# Patient Record
Sex: MALE | Race: WHITE | NOT HISPANIC OR LATINO | Employment: UNEMPLOYED | URBAN - NONMETROPOLITAN AREA
[De-identification: names, ages, dates, MRNs, and addresses within clinical notes are randomized per-mention and may not be internally consistent; named-entity substitution may affect disease eponyms.]

---

## 2017-03-07 ENCOUNTER — OFFICE VISIT (OUTPATIENT)
Dept: CARDIOLOGY | Facility: CLINIC | Age: 47
End: 2017-03-07

## 2017-03-07 VITALS
HEART RATE: 101 BPM | SYSTOLIC BLOOD PRESSURE: 134 MMHG | DIASTOLIC BLOOD PRESSURE: 80 MMHG | WEIGHT: 159 LBS | HEIGHT: 69 IN | OXYGEN SATURATION: 98 % | BODY MASS INDEX: 23.55 KG/M2

## 2017-03-07 DIAGNOSIS — I10 ESSENTIAL HYPERTENSION: ICD-10-CM

## 2017-03-07 DIAGNOSIS — I77.9 BILATERAL CAROTID ARTERY DISEASE (HCC): ICD-10-CM

## 2017-03-07 DIAGNOSIS — R42 DIZZINESS: ICD-10-CM

## 2017-03-07 DIAGNOSIS — R00.0 TACHYCARDIA: ICD-10-CM

## 2017-03-07 DIAGNOSIS — R06.02 SHORTNESS OF BREATH: ICD-10-CM

## 2017-03-07 DIAGNOSIS — E11.9 TYPE 2 DIABETES MELLITUS WITHOUT COMPLICATION, WITHOUT LONG-TERM CURRENT USE OF INSULIN (HCC): ICD-10-CM

## 2017-03-07 DIAGNOSIS — R00.2 PALPITATIONS: ICD-10-CM

## 2017-03-07 DIAGNOSIS — R07.9 CHEST PAIN, UNSPECIFIED TYPE: Primary | ICD-10-CM

## 2017-03-07 DIAGNOSIS — E78.5 HYPERLIPIDEMIA, UNSPECIFIED HYPERLIPIDEMIA TYPE: ICD-10-CM

## 2017-03-07 PROCEDURE — 93000 ELECTROCARDIOGRAM COMPLETE: CPT | Performed by: NURSE PRACTITIONER

## 2017-03-07 PROCEDURE — 99214 OFFICE O/P EST MOD 30 MIN: CPT | Performed by: NURSE PRACTITIONER

## 2017-03-07 RX ORDER — AMITRIPTYLINE HYDROCHLORIDE 25 MG/1
25 TABLET, FILM COATED ORAL NIGHTLY
COMMUNITY
Start: 2017-03-02 | End: 2018-04-11

## 2017-03-07 RX ORDER — CLOPIDOGREL BISULFATE 75 MG/1
75 TABLET ORAL DAILY
COMMUNITY
Start: 2017-03-01 | End: 2018-07-09 | Stop reason: HOSPADM

## 2017-03-07 RX ORDER — BUDESONIDE AND FORMOTEROL FUMARATE DIHYDRATE 160; 4.5 UG/1; UG/1
2 AEROSOL RESPIRATORY (INHALATION)
COMMUNITY
Start: 2017-01-17

## 2017-03-07 RX ORDER — PANTOPRAZOLE SODIUM 40 MG/1
40 TABLET, DELAYED RELEASE ORAL DAILY
COMMUNITY
Start: 2017-03-01

## 2017-03-07 RX ORDER — INSULIN LISPRO 100 [IU]/ML
20 INJECTION, SOLUTION INTRAVENOUS; SUBCUTANEOUS 4 TIMES DAILY
COMMUNITY
Start: 2017-02-02

## 2017-03-07 RX ORDER — TIZANIDINE 4 MG/1
4 TABLET ORAL 3 TIMES DAILY
COMMUNITY
Start: 2017-03-01

## 2017-03-07 RX ORDER — INSULIN GLARGINE 100 [IU]/ML
INJECTION, SOLUTION SUBCUTANEOUS
COMMUNITY
Start: 2017-02-02 | End: 2018-04-11 | Stop reason: ALTCHOICE

## 2017-03-07 RX ORDER — ASPIRIN 81 MG/1
81 TABLET ORAL DAILY
COMMUNITY
Start: 2017-03-01

## 2017-03-07 RX ORDER — ATORVASTATIN CALCIUM 20 MG/1
20 TABLET, FILM COATED ORAL NIGHTLY
COMMUNITY
Start: 2017-03-01

## 2017-03-07 RX ORDER — GABAPENTIN 600 MG/1
600 TABLET ORAL 3 TIMES DAILY
COMMUNITY
Start: 2017-03-01

## 2017-03-07 RX ORDER — LISINOPRIL 5 MG/1
5 TABLET ORAL DAILY
COMMUNITY
Start: 2017-01-03 | End: 2017-10-12 | Stop reason: ALTCHOICE

## 2017-03-07 RX ORDER — ISOSORBIDE MONONITRATE 30 MG/1
30 TABLET, EXTENDED RELEASE ORAL DAILY
Qty: 30 TABLET | Refills: 5 | Status: SHIPPED | OUTPATIENT
Start: 2017-03-07 | End: 2017-06-20 | Stop reason: SINTOL

## 2017-03-07 RX ORDER — MELOXICAM 15 MG/1
15 TABLET ORAL DAILY
COMMUNITY
Start: 2017-03-01

## 2017-03-07 RX ORDER — ISOSORBIDE MONONITRATE 30 MG/1
30 TABLET, EXTENDED RELEASE ORAL DAILY
COMMUNITY
End: 2017-03-07 | Stop reason: SDUPTHER

## 2017-03-07 NOTE — PATIENT INSTRUCTIONS
Palpitations  A palpitation is the feeling that your heartbeat is irregular or is faster than normal. It may feel like your heart is fluttering or skipping a beat. Palpitations are usually not a serious problem. However, in some cases, you may need further medical evaluation.  CAUSES   Palpitations can be caused by:  · Smoking.  · Caffeine or other stimulants, such as diet pills or energy drinks.  · Alcohol.  · Stress and anxiety.  · Strenuous physical activity.  · Fatigue.  · Certain medicines.  · Heart disease, especially if you have a history of irregular heart rhythms (arrhythmias), such as atrial fibrillation, atrial flutter, or supraventricular tachycardia.  · An improperly working pacemaker or defibrillator.  DIAGNOSIS   To find the cause of your palpitations, your health care provider will take your medical history and perform a physical exam. Your health care provider may also have you take a test called an ambulatory electrocardiogram (ECG). An ECG records your heartbeat patterns over a 24-hour period. You may also have other tests, such as:  · Transthoracic echocardiogram (TTE). During echocardiography, sound waves are used to evaluate how blood flows through your heart.  · Transesophageal echocardiogram (ALY).  · Cardiac monitoring. This allows your health care provider to monitor your heart rate and rhythm in real time.  · Holter monitor. This is a portable device that records your heartbeat and can help diagnose heart arrhythmias. It allows your health care provider to track your heart activity for several days, if needed.  · Stress tests by exercise or by giving medicine that makes the heart beat faster.  TREATMENT   Treatment of palpitations depends on the cause of your symptoms and can vary greatly. Most cases of palpitations do not require any treatment other than time, relaxation, and monitoring your symptoms. Other causes, such as atrial fibrillation, atrial flutter, or supraventricular  tachycardia, usually require further treatment.  HOME CARE INSTRUCTIONS   · Avoid:    Caffeinated coffee, tea, soft drinks, diet pills, and energy drinks.    Chocolate.    Alcohol.  · Stop smoking if you smoke.  · Reduce your stress and anxiety. Things that can help you relax include:    A method of controlling things in your body, such as your heartbeats, with your mind (biofeedback).    Yoga.    Meditation.    Physical activity such as swimming, jogging, or walking.  · Get plenty of rest and sleep.  SEEK MEDICAL CARE IF:   · You continue to have a fast or irregular heartbeat beyond 24 hours.  · Your palpitations occur more often.  SEEK IMMEDIATE MEDICAL CARE IF:  · You have chest pain or shortness of breath.  · You have a severe headache.  · You feel dizzy or you faint.  MAKE SURE YOU:  · Understand these instructions.  · Will watch your condition.  · Will get help right away if you are not doing well or get worse.     This information is not intended to replace advice given to you by your health care provider. Make sure you discuss any questions you have with your health care provider.     Document Released: 12/15/2001 Document Revised: 12/23/2014 Document Reviewed: 09/01/2016  [employer] Interactive Patient Education ©2016 [employer]  You Can Quit Smoking  If you are ready to quit smoking or are thinking about it, congratulations! You have chosen to help yourself be healthier and live longer! There are lots of different ways to quit smoking. Nicotine gum, nicotine patches, a nicotine inhaler, or nicotine nasal spray can help with physical craving. Hypnosis, support groups, and medicines help break the habit of smoking.  TIPS TO GET OFF AND STAY OFF CIGARETTES  · Learn to predict your moods. Do not let a bad situation be your excuse to have a cigarette. Some situations in your life might tempt you to have a cigarette.  · Ask friends and co-workers not to smoke around you.  · Make your home smoke-free.  · Never  "have \"just one\" cigarette. It leads to wanting another and another. Remind yourself of your decision to quit.  · On a card, make a list of your reasons for not smoking. Read it at least the same number of times a day as you have a cigarette. Tell yourself everyday, \"I do not want to smoke. I choose not to smoke.\"  · Ask someone at home or work to help you with your plan to quit smoking.  · Have something planned after you eat or have a cup of coffee. Take a walk or get other exercise to perk you up. This will help to keep you from overeating.  · Try a relaxation exercise to calm you down and decrease your stress. Remember, you may be tense and nervous the first two weeks after you quit. This will pass.  · Find new activities to keep your hands busy. Play with a pen, coin, or rubber band. Doodle or draw things on paper.  · Brush your teeth right after eating. This will help cut down the craving for the taste of tobacco after meals. You can try mouthwash too.  · Try gum, breath mints, or diet candy to keep something in your mouth.  IF YOU SMOKE AND WANT TO QUIT:  · Do not stock up on cigarettes. Never buy a carton. Wait until one pack is finished before you buy another.  · Never carry cigarettes with you at work or at home.  · Keep cigarettes as far away from you as possible. Leave them with someone else.  · Never carry matches or a lighter with you.  · Ask yourself, \"Do I need this cigarette or is this just a reflex?\"  · Bet with someone that you can quit. Put cigarette money in a [employer] bank every morning. If you smoke, you give up the money. If you do not smoke, by the end of the week, you keep the money.  · Keep trying. It takes 21 days to change a habit!  · Talk to your doctor about using medicines to help you quit. These include nicotine replacement gum, lozenges, or skin patches.     This information is not intended to replace advice given to you by your health care provider. Make sure you discuss any questions " you have with your health care provider.     Document Released: 10/14/2010 Document Revised: 03/11/2013 Document Reviewed: 05/03/2016  [employer] Interactive Patient Education ©2016 [employer]

## 2017-03-07 NOTE — PROGRESS NOTES
Subjective   Hugo Castaneda is a 46 y.o. male     Chief Complaint   Patient presents with   • Follow-up     presents as a follow up   • Surgical Clearance       HPI    PROBLEM LIST:     1. Hypertension.  1.1 Echo 9/29/15 - EF > 65%; mild thickening of AIS, mild TR, [place]  2. Dyslipidemia. Off statins due to elevated LFTs  3. Diabetes mellitus II.   4. Chronic tobacco habituation.  5. Low back pain.   6. Stress Test 10/5/15 - no ischemia, low risk  7. Carotid Artery Disease  7.1 Carotid Artery U/S 9/29/15 - 16-49% OSMANY and LICA stenosis; antegrade flow both vertebrals  8. COPD    Patient is a 46-year-old male who presents today for a follow-up and cardiac clearance.  He denies any chest pain or pressure.  He says that he had an episode of palpitations a couple of weeks ago.  He will get dizzy at times with position changes, but denies any presyncope or syncope.  He denies any orthopnea, PND or edema.  He will get short of breath with activity, which has not changed in a long time and he relates this to his COPD.  Patient still smokes 1/2 PPD.  PCP monitors his cholesterol.     Current Outpatient Prescriptions   Medication Sig Dispense Refill   • amitriptyline (ELAVIL) 25 MG tablet Take 25 mg by mouth Every Night.     • aspirin 81 MG EC tablet Take 81 mg by mouth Daily.     • atorvastatin (LIPITOR) 20 MG tablet Take 20 mg by mouth Every Night.     • clopidogrel (PLAVIX) 75 MG tablet Take 75 mg by mouth Daily.     • gabapentin (NEURONTIN) 600 MG tablet Take 600 mg by mouth 3 (Three) Times a Day.     • HUMALOG KWIKPEN 100 UNIT/ML solution pen-injector      • isosorbide mononitrate (IMDUR) 30 MG 24 hr tablet Take 1 tablet by mouth Daily. Take 1/2 tablet daily 30 tablet 5   • LANTUS SOLOSTAR 100 UNIT/ML injection pen      • lisinopril (PRINIVIL,ZESTRIL) 5 MG tablet Take 5 mg by mouth Daily.     • meloxicam (MOBIC) 15 MG tablet Take 15 mg by mouth Daily.     • metFORMIN (GLUCOPHAGE) 1000 MG tablet Take 1,000 mg by mouth 2  (Two) Times a Day.     • pantoprazole (PROTONIX) 40 MG EC tablet Take 40 mg by mouth Daily.     • SYMBICORT 160-4.5 MCG/ACT inhaler      • tiZANidine (ZANAFLEX) 4 MG tablet Take 4 mg by mouth 3 (Three) Times a Day.     • VENTOLIN  (90 BASE) MCG/ACT inhaler        No current facility-administered medications for this visit.        ALLERGIES    Review of patient's allergies indicates no known allergies.    Past Medical History   Diagnosis Date   • Diabetes mellitus    • Hyperlipidemia    • Hypertension        Social History     Social History   • Marital status: Single     Spouse name: N/A   • Number of children: N/A   • Years of education: N/A     Occupational History   • Not on file.     Social History Main Topics   • Smoking status: Current Every Day Smoker     Packs/day: 1.00     Types: Cigarettes   • Smokeless tobacco: Not on file      Comment: 1 pack every 2 days   • Alcohol use No   • Drug use: No   • Sexual activity: Defer     Other Topics Concern   • Not on file     Social History Narrative   • No narrative on file       Family History   Problem Relation Age of Onset   • No Known Problems Mother    • No Known Problems Father        Review of Systems   Constitutional: Positive for fatigue. Negative for diaphoresis.   HENT: Positive for rhinorrhea and sneezing.    Eyes: Positive for visual disturbance (wears glasses ).   Respiratory: Positive for shortness of breath (No change with activity  COPD ). Negative for chest tightness.    Cardiovascular: Positive for palpitations (on episode a couple of weeks ago. ) and leg swelling (all of the time). Negative for chest pain.   Gastrointestinal: Negative for nausea and vomiting.   Endocrine: Negative.    Genitourinary: Negative for difficulty urinating.        H/O cystoscope bladder had collapsed.    Musculoskeletal: Positive for arthralgias, back pain (L4-L5 needs rods ) and myalgias.   Skin: Negative.    Allergic/Immunologic: Positive for environmental  "allergies.   Neurological: Positive for dizziness (when gets up at times ), numbness (BLE weakness; will fall ) and headaches. Negative for syncope and light-headedness.   Hematological: Bruises/bleeds easily.   Psychiatric/Behavioral: The patient is nervous/anxious.        Objective   Visit Vitals   • /80 (BP Location: Left arm, Patient Position: Sitting)   • Pulse 101   • Ht 69\" (175.3 cm)   • Wt 159 lb (72.1 kg)   • SpO2 98%   • BMI 23.48 kg/m2     Lab Results (most recent)     None        Physical Exam   Constitutional: He is oriented to person, place, and time. Vital signs are normal. He appears well-developed and well-nourished. He is active and cooperative.   HENT:   Head: Normocephalic.   Mouth/Throat: Abnormal dentition (edentulous ).   Eyes: Lids are normal.   Wears glasses    Neck: Normal carotid pulses, no hepatojugular reflux and no JVD present. Carotid bruit is not present.   Cardiovascular: Regular rhythm and normal heart sounds.  Tachycardia present.    Pulses:       Radial pulses are 2+ on the right side, and 2+ on the left side.        Dorsalis pedis pulses are 2+ on the right side, and 2+ on the left side.        Posterior tibial pulses are 2+ on the right side, and 2+ on the left side.   No edema BLE.    Pulmonary/Chest: Effort normal and breath sounds normal.   Abdominal: Normal appearance.   Neurological: He is alert and oriented to person, place, and time.   Skin: Skin is warm, dry and intact.   Psychiatric: He has a normal mood and affect. His speech is normal and behavior is normal. Judgment and thought content normal. Cognition and memory are normal.       Procedure     ECG 12 Lead  Date/Time: 3/7/2017 2:43 PM  Performed by: SANDRITA JEROME  Authorized by: SANDRITA JEROME   Comparison: compared with previous ECG from 11/25/2015  Similar to previous ECG  Rhythm: sinus tachycardia  Rate: tachycardic  BPM: 103  QRS axis: normal  Other findings: LAE  Clinical impression: non-specific " ECG  Comments: Chest pain  Palpitations  Shortness of breath               Assessment/Plan      Diagnosis Plan   1. Chest pain, unspecified type  ECG 12 Lead   2. Palpitations  ECG 12 Lead    isosorbide mononitrate (IMDUR) 30 MG 24 hr tablet   3. Shortness of breath  ECG 12 Lead   4. Essential hypertension  isosorbide mononitrate (IMDUR) 30 MG 24 hr tablet   5. Hyperlipidemia, unspecified hyperlipidemia type     6. Type 2 diabetes mellitus without complication, without long-term current use of insulin     7. Bilateral carotid artery disease  Duplex Carotid Ultrasound CAR    Duplex Carotid Ultrasound CAR   8. Dizziness  Duplex Carotid Ultrasound CAR    Duplex Carotid Ultrasound CAR   9. Tachycardia         Return for After testing.         Patient is doing well from a cardiac standpoint.  We will be able to clear him for surgery.  He will continue his medication regimen.  He will get a repeat carotid US.  He will follow-up after testing or sooner if any changes. He was encouraged on smoking cessation.

## 2017-06-19 ENCOUNTER — TELEPHONE (OUTPATIENT)
Dept: CARDIOLOGY | Facility: CLINIC | Age: 47
End: 2017-06-19

## 2017-06-19 NOTE — TELEPHONE ENCOUNTER
----- Message from Sheri Phan sent at 6/19/2017 12:10 PM EDT -----  [phone] is his number. When he stands up he is having dizzy spells and would like to talk to a nurse. Please advise. kg    Patient reports that he is having increase syncopal episodes over the last 2 weeks. States that he hit his head a few days ago. Per NORMA Rainey patient to be seen in office in the AM.

## 2017-06-20 ENCOUNTER — TELEPHONE (OUTPATIENT)
Dept: CARDIOLOGY | Facility: CLINIC | Age: 47
End: 2017-06-20

## 2017-06-20 ENCOUNTER — OFFICE VISIT (OUTPATIENT)
Dept: CARDIOLOGY | Facility: CLINIC | Age: 47
End: 2017-06-20

## 2017-06-20 VITALS
DIASTOLIC BLOOD PRESSURE: 53 MMHG | OXYGEN SATURATION: 98 % | HEIGHT: 69 IN | BODY MASS INDEX: 25.09 KG/M2 | SYSTOLIC BLOOD PRESSURE: 94 MMHG | WEIGHT: 169.4 LBS | HEART RATE: 88 BPM

## 2017-06-20 DIAGNOSIS — E78.5 HYPERLIPIDEMIA, UNSPECIFIED HYPERLIPIDEMIA TYPE: Primary | ICD-10-CM

## 2017-06-20 DIAGNOSIS — I10 ESSENTIAL HYPERTENSION: ICD-10-CM

## 2017-06-20 DIAGNOSIS — F17.200 SMOKING: ICD-10-CM

## 2017-06-20 DIAGNOSIS — I77.9 BILATERAL CAROTID ARTERY DISEASE (HCC): ICD-10-CM

## 2017-06-20 DIAGNOSIS — R00.2 PALPITATIONS: ICD-10-CM

## 2017-06-20 DIAGNOSIS — R06.02 SHORTNESS OF BREATH: ICD-10-CM

## 2017-06-20 DIAGNOSIS — R07.2 PRECORDIAL PAIN: ICD-10-CM

## 2017-06-20 DIAGNOSIS — R55 SYNCOPE, UNSPECIFIED SYNCOPE TYPE: ICD-10-CM

## 2017-06-20 PROCEDURE — 99214 OFFICE O/P EST MOD 30 MIN: CPT | Performed by: NURSE PRACTITIONER

## 2017-06-20 NOTE — PROGRESS NOTES
Subjective   Hugo Castaneda is a 47 y.o. male     Chief Complaint   Patient presents with   • Follow-up     presents as a follow up   • Loss of Consciousness       HPI    PROBLEM LIST:     1. Hypertension.  1.1 Echo 9/29/15 - EF > 65%; mild thickening of AIS, mild TR, [place]  2. Dyslipidemia. Off statins due to elevated LFTs  3. Diabetes mellitus II.   4. Chronic tobacco habituation.  5. Low back pain.   6. Stress Test 10/5/15 - no ischemia, low risk  7. Carotid Artery Disease  7.1 Carotid Artery U/S 9/29/15 - 16-49% OSMANY and LICA stenosis; antegrade flow both vertebrals  8. COPD    Patient is a 47-year-old male who presents today due to syncope.  He had carotid US ordered the last time he was here, but has not had it done yet.  He says he has been having midsternum chest heaviness that will radiate into his left arm.  He will get short of breath, lightheaded and diaphoretic when it occurs.  He says he will take nitro and has taken up to 2.  He has been having more freq palpitations as well.  He will have them quite often especially when he lays down.  He denies any dizziness.  He says he has been passing out a lot, mainly when he stands up.  He has been having them more freq in the past 2 weeks.  He says the only thing that occurs prior to passing out is he will see black.  He does not know what his blood pressure is nor his sugar, but his sugar is rarely less than 200 as it was 400 this AM.  He will get short of breath all of the time.  He has not been to ER for any of his syncopal episodes.  Patient is still smoking 1 pack every 3 days.  He was negative for orthostatic hypotension.     Current Outpatient Prescriptions   Medication Sig Dispense Refill   • amitriptyline (ELAVIL) 25 MG tablet Take 25 mg by mouth Every Night.     • aspirin 81 MG EC tablet Take 81 mg by mouth Daily.     • atorvastatin (LIPITOR) 20 MG tablet Take 20 mg by mouth Every Night.     • clopidogrel (PLAVIX) 75 MG tablet Take 75 mg by mouth  Daily.     • gabapentin (NEURONTIN) 600 MG tablet Take 600 mg by mouth 3 (Three) Times a Day.     • HUMALOG KWIKPEN 100 UNIT/ML solution pen-injector      • LANTUS SOLOSTAR 100 UNIT/ML injection pen      • lisinopril (PRINIVIL,ZESTRIL) 5 MG tablet Take 5 mg by mouth Daily.     • meloxicam (MOBIC) 15 MG tablet Take 15 mg by mouth Daily.     • metFORMIN (GLUCOPHAGE) 1000 MG tablet Take 1,000 mg by mouth 2 (Two) Times a Day.     • pantoprazole (PROTONIX) 40 MG EC tablet Take 40 mg by mouth Daily.     • SYMBICORT 160-4.5 MCG/ACT inhaler      • tiZANidine (ZANAFLEX) 4 MG tablet Take 4 mg by mouth 3 (Three) Times a Day.     • VENTOLIN  (90 BASE) MCG/ACT inhaler        No current facility-administered medications for this visit.        ALLERGIES    Review of patient's allergies indicates no known allergies.    Past Medical History:   Diagnosis Date   • Diabetes mellitus    • Hyperlipidemia    • Hypertension        Social History     Social History   • Marital status: Single     Spouse name: N/A   • Number of children: N/A   • Years of education: N/A     Occupational History   • Not on file.     Social History Main Topics   • Smoking status: Current Every Day Smoker     Packs/day: 1.00     Types: Cigarettes   • Smokeless tobacco: Not on file      Comment: 1 pack every 2 days   • Alcohol use No   • Drug use: No   • Sexual activity: Defer     Other Topics Concern   • Not on file     Social History Narrative       Family History   Problem Relation Age of Onset   • No Known Problems Mother    • No Known Problems Father        Review of Systems   Constitutional: Positive for diaphoresis (with CP ) and fatigue.   HENT: Positive for nosebleeds (2 mos after he eats ). Negative for rhinorrhea and sneezing.    Eyes: Positive for visual disturbance (wears glasses ).   Respiratory: Positive for shortness of breath (all of the time, no change and with CP ). Negative for chest tightness.    Cardiovascular: Positive for chest pain  "(midsternum heaviness and radiates into left arm at least an episode/week, using nitro) and palpitations (happen quite often even when he lays down). Negative for leg swelling.   Gastrointestinal: Positive for nausea and vomiting.   Endocrine: Negative.    Genitourinary: Negative for difficulty urinating.   Musculoskeletal: Positive for arthralgias and neck pain (like a muscle cramp ). Negative for back pain.   Skin: Negative.    Allergic/Immunologic: Positive for environmental allergies.   Neurological: Positive for syncope (for about 2 weeks, twice a week and now more often; sees black before it happens ), light-headedness (with CP ) and headaches. Negative for dizziness.   Hematological: Bruises/bleeds easily.   Psychiatric/Behavioral: The patient is nervous/anxious.        Objective   BP 94/53 (BP Location: Left arm, Patient Position: Standing)  Pulse 88  Ht 69\" (175.3 cm)  Wt 169 lb 6.4 oz (76.8 kg)  SpO2 98%  BMI 25.02 kg/m2  Lab Results (most recent)     None        Physical Exam   Constitutional: He is oriented to person, place, and time. Vital signs are normal. He appears well-developed and well-nourished. He is active and cooperative.   HENT:   Head: Normocephalic.   Mouth/Throat: Abnormal dentition (edentulous ).   Eyes: Lids are normal.   Wears glasses    Neck: Normal carotid pulses, no hepatojugular reflux and no JVD present. Carotid bruit is not present.   Cardiovascular: Normal rate, regular rhythm and normal heart sounds.    Pulses:       Radial pulses are 2+ on the right side, and 2+ on the left side.        Dorsalis pedis pulses are 2+ on the right side, and 2+ on the left side.        Posterior tibial pulses are 2+ on the right side, and 2+ on the left side.   No edema BLE.    Pulmonary/Chest: Effort normal and breath sounds normal.   Abdominal: Normal appearance and bowel sounds are normal.   Neurological: He is alert and oriented to person, place, and time.   Skin: Skin is warm, dry and " intact.   Psychiatric: He has a normal mood and affect. His speech is normal and behavior is normal. Judgment and thought content normal. Cognition and memory are normal.       Procedure   Procedures         Assessment/Plan      Diagnosis Plan   1. Hyperlipidemia, unspecified hyperlipidemia type  Stress Test With Myocardial Perfusion One Day    Adult Transthoracic Echo Complete    Stress Test With Myocardial Perfusion One Day    Adult Transthoracic Echo Complete   2. Essential hypertension  Stress Test With Myocardial Perfusion One Day    Adult Transthoracic Echo Complete    Stress Test With Myocardial Perfusion One Day    Adult Transthoracic Echo Complete   3. Palpitations  Stress Test With Myocardial Perfusion One Day    Adult Transthoracic Echo Complete    Cardiac Event Monitor    Stress Test With Myocardial Perfusion One Day    Adult Transthoracic Echo Complete    Cardiac Event Monitor   4. Smoking  Stress Test With Myocardial Perfusion One Day    Adult Transthoracic Echo Complete    Stress Test With Myocardial Perfusion One Day    Adult Transthoracic Echo Complete   5. Bilateral carotid artery disease  Stress Test With Myocardial Perfusion One Day    Adult Transthoracic Echo Complete    Stress Test With Myocardial Perfusion One Day    Adult Transthoracic Echo Complete   6. Precordial pain  D-dimer, Quantitative    CK-MB    Troponin I    Stress Test With Myocardial Perfusion One Day    Adult Transthoracic Echo Complete    D-dimer, Quantitative    CK-MB    Troponin I    Stress Test With Myocardial Perfusion One Day    Adult Transthoracic Echo Complete   7. Shortness of breath  D-dimer, Quantitative    CK-MB    Troponin I    Stress Test With Myocardial Perfusion One Day    Adult Transthoracic Echo Complete    D-dimer, Quantitative    CK-MB    Troponin I    Stress Test With Myocardial Perfusion One Day    Adult Transthoracic Echo Complete   8. Syncope, unspecified syncope type  Tilt Table    Tilt Table       Return  in about 8 weeks (around 8/15/2017).       CP/Shortness of breath/HTN/Dyslipidemia/Smoking/Syncpoe - patient will have an ischemia work-up, stress and echo.  Palpitations/Syncope - will have a tilt test and wear an event monitor.  Ted Carotid Artery disease/Syncope - will have repeat carotid artery US.  Patient will continue his medication regimen, except he will stop Imdur.  He will follow-up in 8 weeks or sooner if any changes.  He will use Nitro PRN for chest pain, no resolution he will go to the ER.  He sees his PCP today and will have them send labs.  He was encouraged on smoking cessation.

## 2017-06-20 NOTE — PATIENT INSTRUCTIONS
Syncope  Syncope is when you temporarily lose consciousness. Syncope may also be called fainting or passing out. It is caused by a sudden decrease in blood flow to the brain. Even though most causes of syncope are not dangerous, syncope can be a sign of a serious medical problem. Signs that you may be about to faint include:  · Feeling dizzy or light-headed.  · Feeling nauseous.  · Seeing all white or all black in your field of vision.  · Having cold, clammy skin.  If you fainted, get medical help right away. Call your local emergency services (911 in the [place] Do not drive yourself to the hospital.  HOME CARE INSTRUCTIONS  Pay attention to any changes in your symptoms. Take these actions to help with your condition:  · Have someone stay with you until you feel stable.  · Do not drive, use machinery, or play sports until your health care provider says it is okay.  · Keep all follow-up visits as told by your health care provider. This is important.  · If you start to feel like you might faint, lie down right away and raise (elevate) your feet above the level of your heart. Breathe deeply and steadily. Wait until all of the symptoms have passed.  · Drink enough fluid to keep your urine clear or pale yellow.  · If you are taking blood pressure or heart medicine, get up slowly and take several minutes to sit and then stand. This can reduce dizziness.  · Take over-the-counter and prescription medicines only as told by your health care provider.  SEEK IMMEDIATE MEDICAL CARE IF:  · You have a severe headache.  · You have unusual pain in your chest, abdomen, or back.  · You are bleeding from your mouth or rectum, or you have black or tarry stool.  · You have a very fast or irregular heartbeat (palpitations).  · You have pain with breathing.  · You faint once or repeatedly.  · You have a seizure.  · You are confused.  · You have trouble walking.  · You have severe weakness.  · You have vision problems.  These symptoms may  represent a serious problem that is an emergency. Do not wait to see if your symptoms will go away. Get medical help right away. Call your local emergency services (911 in the [place] Do not drive yourself to the hospital.     This information is not intended to replace advice given to you by your health care provider. Make sure you discuss any questions you have with your health care provider.     Document Released: 12/18/2006 Document Revised: 04/10/2017 Document Reviewed: 08/31/2016  [employer] Interactive Patient Education ©2017 [employer]  Nonspecific Chest Pain   Chest pain can be caused by many different conditions. There is always a chance that your pain could be related to something serious, such as a heart attack or a blood clot in your lungs. Chest pain can also be caused by conditions that are not life-threatening. If you have chest pain, it is very important to follow up with your health care provider.  CAUSES   Chest pain can be caused by:  · Heartburn.  · Pneumonia or bronchitis.  · Anxiety or stress.  · Inflammation around your heart (pericarditis) or lung (pleuritis or pleurisy).  · A blood clot in your lung.  · A collapsed lung (pneumothorax). It can develop suddenly on its own (spontaneous pneumothorax) or from trauma to the chest.  · Shingles infection (varicella-zoster virus).  · Heart attack.  · Damage to the bones, muscles, and cartilage that make up your chest wall. This can include:    Bruised bones due to injury.    Strained muscles or cartilage due to frequent or repeated coughing or overwork.    Fracture to one or more ribs.    Sore cartilage due to inflammation (costochondritis).  RISK FACTORS   Risk factors for chest pain may include:  · Activities that increase your risk for trauma or injury to your chest.  · Respiratory infections or conditions that cause frequent coughing.  · Medical conditions or overeating that can cause heartburn.  · Heart disease or family history of heart  disease.  · Conditions or health behaviors that increase your risk of developing a blood clot.  · Having had chicken pox (varicella zoster).  SIGNS AND SYMPTOMS  Chest pain can feel like:  · Burning or tingling on the surface of your chest or deep in your chest.  · Crushing, pressure, aching, or squeezing pain.  · Dull or sharp pain that is worse when you move, cough, or take a deep breath.  · Pain that is also felt in your back, neck, shoulder, or arm, or pain that spreads to any of these areas.  Your chest pain may come and go, or it may stay constant.  DIAGNOSIS  Lab tests or other studies may be needed to find the cause of your pain. Your health care provider may have you take a test called an ambulatory ECG (electrocardiogram). An ECG records your heartbeat patterns at the time the test is performed. You may also have other tests, such as:  · Transthoracic echocardiogram (TTE). During echocardiography, sound waves are used to create a picture of all of the heart structures and to look at how blood flows through your heart.  · Transesophageal echocardiogram (ALY). This is a more advanced imaging test that obtains images from inside your body. It allows your health care provider to see your heart in finer detail.  · Cardiac monitoring. This allows your health care provider to monitor your heart rate and rhythm in real time.  · Holter monitor. This is a portable device that records your heartbeat and can help to diagnose abnormal heartbeats. It allows your health care provider to track your heart activity for several days, if needed.  · Stress tests. These can be done through exercise or by taking medicine that makes your heart beat more quickly.  · Blood tests.  · Imaging tests.  TREATMENT   Your treatment depends on what is causing your chest pain. Treatment may include:  · Medicines. These may include:    Acid blockers for heartburn.    Anti-inflammatory medicine.    Pain medicine for inflammatory conditions.     Antibiotic medicine, if an infection is present.    Medicines to dissolve blood clots.    Medicines to treat coronary artery disease.  · Supportive care for conditions that do not require medicines. This may include:    Resting.    Applying heat or cold packs to injured areas.    Limiting activities until pain decreases.  HOME CARE INSTRUCTIONS  · If you were prescribed an antibiotic medicine, finish it all even if you start to feel better.  · Avoid any activities that bring on chest pain.  · Do not use any tobacco products, including cigarettes, chewing tobacco, or electronic cigarettes. If you need help quitting, ask your health care provider.  · Do not drink alcohol.  · Take medicines only as directed by your health care provider.  · Keep all follow-up visits as directed by your health care provider. This is important. This includes any further testing if your chest pain does not go away.  · If heartburn is the cause for your chest pain, you may be told to keep your head raised (elevated) while sleeping. This reduces the chance that acid will go from your stomach into your esophagus.  · Make lifestyle changes as directed by your health care provider. These may include:    Getting regular exercise. Ask your health care provider to suggest some activities that are safe for you.    Eating a heart-healthy diet. A registered dietitian can help you to learn healthy eating options.    Maintaining a healthy weight.    Managing diabetes, if necessary.    Reducing stress.  SEEK MEDICAL CARE IF:  · Your chest pain does not go away after treatment.  · You have a rash with blisters on your chest.  · You have a fever.  SEEK IMMEDIATE MEDICAL CARE IF:   · Your chest pain is worse.  · You have an increasing cough, or you cough up blood.  · You have severe abdominal pain.  · You have severe weakness.  · You faint.  · You have chills.  · You have sudden, unexplained chest discomfort.  · You have sudden, unexplained discomfort in  "your arms, back, neck, or jaw.  · You have shortness of breath at any time.  · You suddenly start to sweat, or your skin gets clammy.  · You feel nauseous or you vomit.  · You suddenly feel light-headed or dizzy.  · Your heart begins to beat quickly, or it feels like it is skipping beats.  These symptoms may represent a serious problem that is an emergency. Do not wait to see if the symptoms will go away. Get medical help right away. Call your local emergency services (911 in the [place] Do not drive yourself to the hospital.     This information is not intended to replace advice given to you by your health care provider. Make sure you discuss any questions you have with your health care provider.     Document Released: 09/27/2006 Document Revised: 01/08/2016 Document Reviewed: 07/24/2015  [employer] Interactive Patient Education ©2017 [employer]    You Can Quit Smoking  If you are ready to quit smoking or are thinking about it, congratulations! You have chosen to help yourself be healthier and live longer! There are lots of different ways to quit smoking. Nicotine gum, nicotine patches, a nicotine inhaler, or nicotine nasal spray can help with physical craving. Hypnosis, support groups, and medicines help break the habit of smoking.  TIPS TO GET OFF AND STAY OFF CIGARETTES  · Learn to predict your moods. Do not let a bad situation be your excuse to have a cigarette. Some situations in your life might tempt you to have a cigarette.  · Ask friends and co-workers not to smoke around you.  · Make your home smoke-free.  · Never have \"just one\" cigarette. It leads to wanting another and another. Remind yourself of your decision to quit.  · On a card, make a list of your reasons for not smoking. Read it at least the same number of times a day as you have a cigarette. Tell yourself everyday, \"I do not want to smoke. I choose not to smoke.\"  · Ask someone at home or work to help you with your plan to quit smoking.  · Have " "something planned after you eat or have a cup of coffee. Take a walk or get other exercise to perk you up. This will help to keep you from overeating.  · Try a relaxation exercise to calm you down and decrease your stress. Remember, you may be tense and nervous the first two weeks after you quit. This will pass.  · Find new activities to keep your hands busy. Play with a pen, coin, or rubber band. Doodle or draw things on paper.  · Brush your teeth right after eating. This will help cut down the craving for the taste of tobacco after meals. You can try mouthwash too.  · Try gum, breath mints, or diet candy to keep something in your mouth.  IF YOU SMOKE AND WANT TO QUIT:  · Do not stock up on cigarettes. Never buy a carton. Wait until one pack is finished before you buy another.  · Never carry cigarettes with you at work or at home.  · Keep cigarettes as far away from you as possible. Leave them with someone else.  · Never carry matches or a lighter with you.  · Ask yourself, \"Do I need this cigarette or is this just a reflex?\"  · Bet with someone that you can quit. Put cigarette money in a [employer] bank every morning. If you smoke, you give up the money. If you do not smoke, by the end of the week, you keep the money.  · Keep trying. It takes 21 days to change a habit!  · Talk to your doctor about using medicines to help you quit. These include nicotine replacement gum, lozenges, or skin patches.     This information is not intended to replace advice given to you by your health care provider. Make sure you discuss any questions you have with your health care provider.     Document Released: 10/14/2010 Document Revised: 03/11/2013 Document Reviewed: 05/03/2016  [employer] Interactive Patient Education ©2017 [employer]    "

## 2017-07-24 ENCOUNTER — OUTSIDE FACILITY SERVICE (OUTPATIENT)
Dept: CARDIOLOGY | Facility: CLINIC | Age: 47
End: 2017-07-24

## 2017-07-24 PROCEDURE — 93272 ECG/REVIEW INTERPRET ONLY: CPT | Performed by: INTERNAL MEDICINE

## 2017-08-31 ENCOUNTER — OFFICE VISIT (OUTPATIENT)
Dept: CARDIOLOGY | Facility: CLINIC | Age: 47
End: 2017-08-31

## 2017-08-31 VITALS
SYSTOLIC BLOOD PRESSURE: 123 MMHG | DIASTOLIC BLOOD PRESSURE: 66 MMHG | WEIGHT: 165.6 LBS | HEIGHT: 68 IN | BODY MASS INDEX: 25.1 KG/M2 | HEART RATE: 84 BPM | OXYGEN SATURATION: 99 %

## 2017-08-31 DIAGNOSIS — I73.9 CLAUDICATION OF BOTH LOWER EXTREMITIES (HCC): ICD-10-CM

## 2017-08-31 DIAGNOSIS — R06.02 SHORTNESS OF BREATH: ICD-10-CM

## 2017-08-31 DIAGNOSIS — I10 ESSENTIAL HYPERTENSION: ICD-10-CM

## 2017-08-31 DIAGNOSIS — I77.9 BILATERAL CAROTID ARTERY DISEASE (HCC): ICD-10-CM

## 2017-08-31 DIAGNOSIS — R55 SYNCOPE, UNSPECIFIED SYNCOPE TYPE: ICD-10-CM

## 2017-08-31 DIAGNOSIS — R42 DIZZINESS: ICD-10-CM

## 2017-08-31 DIAGNOSIS — R07.9 CHEST PAIN, UNSPECIFIED TYPE: Primary | ICD-10-CM

## 2017-08-31 DIAGNOSIS — R60.9 PERIPHERAL EDEMA: ICD-10-CM

## 2017-08-31 DIAGNOSIS — R09.89 DECREASED PEDAL PULSES: ICD-10-CM

## 2017-08-31 DIAGNOSIS — E78.5 HYPERLIPIDEMIA, UNSPECIFIED HYPERLIPIDEMIA TYPE: ICD-10-CM

## 2017-08-31 DIAGNOSIS — F17.200 SMOKING: ICD-10-CM

## 2017-08-31 PROCEDURE — 99213 OFFICE O/P EST LOW 20 MIN: CPT | Performed by: NURSE PRACTITIONER

## 2017-08-31 RX ORDER — FUROSEMIDE 20 MG/1
20 TABLET ORAL DAILY PRN
Qty: 30 TABLET | Refills: 3 | Status: SHIPPED | OUTPATIENT
Start: 2017-08-31

## 2017-09-25 ENCOUNTER — HOSPITAL ENCOUNTER (OUTPATIENT)
Dept: CARDIOLOGY | Facility: HOSPITAL | Age: 47
Discharge: HOME | End: 2017-09-25
Admitting: NURSE PRACTITIONER

## 2017-09-25 LAB — BH CV TILT AGENT USED: NORMAL

## 2017-09-25 PROCEDURE — 93660 TILT TABLE EVALUATION: CPT

## 2017-09-25 RX ADMIN — SODIUM CHLORIDE 1 MCG/MIN: 9 INJECTION, SOLUTION INTRAVENOUS at 08:59

## 2017-10-03 ENCOUNTER — HOSPITAL ENCOUNTER (OUTPATIENT)
Dept: CARDIOLOGY | Facility: HOSPITAL | Age: 47
Discharge: HOME | End: 2017-10-03

## 2017-10-03 ENCOUNTER — OUTSIDE FACILITY SERVICE (OUTPATIENT)
Dept: CARDIOLOGY | Facility: CLINIC | Age: 47
End: 2017-10-03

## 2017-10-03 LAB
MAXIMAL PREDICTED HEART RATE: 171 BPM
STRESS TARGET HR: 145 BPM

## 2017-10-03 PROCEDURE — 78452 HT MUSCLE IMAGE SPECT MULT: CPT | Performed by: INTERNAL MEDICINE

## 2017-10-03 PROCEDURE — 93880 EXTRACRANIAL BILAT STUDY: CPT | Performed by: INTERNAL MEDICINE

## 2017-10-03 PROCEDURE — A9500 TC99M SESTAMIBI: HCPCS | Performed by: INTERNAL MEDICINE

## 2017-10-03 PROCEDURE — 93306 TTE W/DOPPLER COMPLETE: CPT

## 2017-10-03 PROCEDURE — 93017 CV STRESS TEST TRACING ONLY: CPT

## 2017-10-03 PROCEDURE — 25010000002 REGADENOSON 0.4 MG/5ML SOLUTION: Performed by: INTERNAL MEDICINE

## 2017-10-03 PROCEDURE — 0 TECHNETIUM SESTAMIBI: Performed by: INTERNAL MEDICINE

## 2017-10-03 PROCEDURE — 93880 EXTRACRANIAL BILAT STUDY: CPT

## 2017-10-03 PROCEDURE — 93925 LOWER EXTREMITY STUDY: CPT

## 2017-10-03 PROCEDURE — 78452 HT MUSCLE IMAGE SPECT MULT: CPT

## 2017-10-03 PROCEDURE — 93306 TTE W/DOPPLER COMPLETE: CPT | Performed by: INTERNAL MEDICINE

## 2017-10-03 PROCEDURE — 93018 CV STRESS TEST I&R ONLY: CPT | Performed by: INTERNAL MEDICINE

## 2017-10-03 RX ADMIN — TECHNETIUM TC-99M SESTAMIBI 1 DOSE: 1 INJECTION INTRAVENOUS at 09:30

## 2017-10-03 RX ADMIN — REGADENOSON 0.4 MG: 0.08 INJECTION, SOLUTION INTRAVENOUS at 09:30

## 2017-10-04 ENCOUNTER — DOCUMENTATION (OUTPATIENT)
Dept: CARDIOLOGY | Facility: CLINIC | Age: 47
End: 2017-10-04

## 2017-10-04 NOTE — PROGRESS NOTES
STRESS TEST RESULTS BACK IN THE OFFICE, 2-3 MO. F/U RECOMMENDED.  PATIENT HAS A  FOLLOW UP APPT.  ON 10-12-17. PH,LPN

## 2017-10-09 ENCOUNTER — DOCUMENTATION (OUTPATIENT)
Dept: CARDIOLOGY | Facility: CLINIC | Age: 47
End: 2017-10-09

## 2017-10-09 NOTE — PROGRESS NOTES
ECHO  RESULTS BACK IN THE OFFICE, 2-3 MO. F/U RECOMMENDED.  HE IS SCHEDULED FOR A  FOLLOW UP APPT. ON 10-12-17. PH,LPN

## 2017-10-10 ENCOUNTER — TELEPHONE (OUTPATIENT)
Dept: CARDIOLOGY | Facility: CLINIC | Age: 47
End: 2017-10-10

## 2017-10-10 NOTE — TELEPHONE ENCOUNTER
----- Message from NORMA Cheema sent at 10/9/2017  6:14 PM EDT -----  Please advise patient on ASA and statin   ----- Message -----     From: Rashmi Rad Results [place] [facility] In     Sent: 10/9/2017   5:47 PM       To: NORMA Cheema

## 2017-10-10 NOTE — TELEPHONE ENCOUNTER
----- Message from NORMA Cheema sent at 10/9/2017  6:14 PM EDT -----  Please advise patient.  On ASA and statin   ----- Message -----     From: Rashmi, Rad Results [place] In     Sent: 10/9/2017   5:44 PM       To: NORMA Cheema

## 2017-10-12 ENCOUNTER — OFFICE VISIT (OUTPATIENT)
Dept: CARDIOLOGY | Facility: CLINIC | Age: 47
End: 2017-10-12

## 2017-10-12 VITALS
WEIGHT: 163 LBS | SYSTOLIC BLOOD PRESSURE: 95 MMHG | OXYGEN SATURATION: 98 % | HEART RATE: 80 BPM | HEIGHT: 68 IN | BODY MASS INDEX: 24.71 KG/M2 | DIASTOLIC BLOOD PRESSURE: 57 MMHG

## 2017-10-12 DIAGNOSIS — I77.9 BILATERAL CAROTID ARTERY DISEASE (HCC): Primary | ICD-10-CM

## 2017-10-12 DIAGNOSIS — I95.9 HYPOTENSION, UNSPECIFIED HYPOTENSION TYPE: ICD-10-CM

## 2017-10-12 DIAGNOSIS — F17.200 SMOKING: ICD-10-CM

## 2017-10-12 DIAGNOSIS — E78.5 HYPERLIPIDEMIA, UNSPECIFIED HYPERLIPIDEMIA TYPE: ICD-10-CM

## 2017-10-12 DIAGNOSIS — R00.2 PALPITATIONS: ICD-10-CM

## 2017-10-12 PROCEDURE — 99213 OFFICE O/P EST LOW 20 MIN: CPT | Performed by: NURSE PRACTITIONER

## 2017-10-12 NOTE — PROGRESS NOTES
Subjective   Hugo Castaneda is a 47 y.o. male     Chief Complaint   Patient presents with   • Palpitations     presents as a follow up       HPI    PROBLEM LIST:     1. Hypertension.  1.1 Echo 9/29/15 - EF > 65%; mild thickening of AIS, mild TR, LA  1.2Echo 10/3/17-mild LVH, EF 55-60%, diastolic dysfunction 1, mild MR, mild TR, PA 25-30  2. Dyslipidemia. Off statins due to elevated LFTs  3. Diabetes mellitus II.   4. Chronic tobacco habituation.  5. Low back pain.   6. Stress Test 10/5/15 - no ischemia, low risk  6.1 stress test 10/3/17-no ischemia, low risk  7. Carotid Artery Disease  7.1 Carotid Artery U/S 9/29/15 - 16-49% OSMANY and LICA stenosis; antegrade flow both vertebrals  7.2 carotid artery ultrasound 10/3/17-16-49% stenosis throughout the R ICA, 50% or less or stenosis and LICA, antegrade flow both vertebral arteries  8. COPD  9. Palpitations   9.1 Event monitor 7/11 through 7/24/17-sinus tach  10. Bilateral lower extremity arterial ultrasound 10/3/17-no obstructive disease is identified  11.  Tilt table test 9/25/17-negative for neurocardiogenic syncope, postural orthostatic tachycardia syndrome and orthostatic syncope even when provoked with intravenous isoproterenol  12.  TB left upper lobe cleared 2016    Patient is a 47-year-old male who presents today for follow-up on testing.  He denies any chest pain, pressure, dizziness, presyncope, syncope, orthopnea, PND or edema.  He says he notices palpitations at times when he is at work.  He says he will get short of breath a lot.  He said he can walk too far without getting short of breath.  He has been coughing up some blood and he is following up with the health Department due to history of TB back in 2016.  He does still smoke one pack every 3 days.  PCP monitors cholesterol.    Current Outpatient Prescriptions   Medication Sig Dispense Refill   • amitriptyline (ELAVIL) 25 MG tablet Take 25 mg by mouth Every Night.     • aspirin 81 MG EC tablet Take  81 mg by mouth Daily.     • atorvastatin (LIPITOR) 20 MG tablet Take 20 mg by mouth Every Night.     • clopidogrel (PLAVIX) 75 MG tablet Take 75 mg by mouth Daily.     • furosemide (LASIX) 20 MG tablet Take 1 tablet by mouth Daily As Needed (edema). 30 tablet 3   • gabapentin (NEURONTIN) 600 MG tablet Take 600 mg by mouth 3 (Three) Times a Day.     • HUMALOG KWIKPEN 100 UNIT/ML solution pen-injector      • LANTUS SOLOSTAR 100 UNIT/ML injection pen      • meloxicam (MOBIC) 15 MG tablet Take 15 mg by mouth Daily.     • metFORMIN (GLUCOPHAGE) 1000 MG tablet Take 1,000 mg by mouth 2 (Two) Times a Day.     • pantoprazole (PROTONIX) 40 MG EC tablet Take 40 mg by mouth Daily.     • SYMBICORT 160-4.5 MCG/ACT inhaler      • tiZANidine (ZANAFLEX) 4 MG tablet Take 4 mg by mouth 3 (Three) Times a Day.     • VENTOLIN  (90 BASE) MCG/ACT inhaler        No current facility-administered medications for this visit.        ALLERGIES    Review of patient's allergies indicates no known allergies.    Past Medical History:   Diagnosis Date   • Diabetes mellitus    • Hyperlipidemia    • Hypertension        Social History     Social History   • Marital status: Single     Spouse name: N/A   • Number of children: N/A   • Years of education: N/A     Occupational History   • Not on file.     Social History Main Topics   • Smoking status: Current Every Day Smoker     Packs/day: 1.00     Types: Cigarettes   • Smokeless tobacco: Never Used      Comment: 1 pack every 2 days   • Alcohol use No   • Drug use: No   • Sexual activity: Defer     Other Topics Concern   • Not on file     Social History Narrative       Family History   Problem Relation Age of Onset   • No Known Problems Mother    • No Known Problems Father        Review of Systems   Constitutional: Positive for fatigue. Negative for diaphoresis.   HENT: Positive for rhinorrhea and sneezing.    Eyes: Positive for visual disturbance (wears glasses).   Respiratory: Positive for cough  "(cough up some blood, history of TB; spot on lungs ) and shortness of breath (can't walk too far without shortness of breath ). Negative for chest tightness.    Cardiovascular: Positive for palpitations (sometimes at work, but not always). Negative for chest pain and leg swelling.   Gastrointestinal: Positive for nausea and vomiting.        Food not completely emptying out of stomach, had upper and lower GI has to have additional testing    Endocrine: Negative.    Genitourinary: Negative for difficulty urinating.   Musculoskeletal: Positive for arthralgias, back pain and neck pain.   Skin: Negative.    Allergic/Immunologic: Positive for environmental allergies.   Neurological: Negative for dizziness, syncope and light-headedness.   Hematological: Negative.    Psychiatric/Behavioral: Negative.        Objective   BP 95/57 (BP Location: Left arm, Patient Position: Sitting)  Pulse 80  Ht 68\" (172.7 cm)  Wt 163 lb (73.9 kg)  SpO2 98%  BMI 24.78 kg/m2  Vitals:    10/12/17 0846 10/12/17 0901   BP: (!) 87/54 95/57   BP Location: Left arm Left arm   Patient Position: Sitting Sitting   Pulse: 81 80   SpO2: 98%    Weight: 163 lb (73.9 kg)    Height: 68\" (172.7 cm)       Lab Results (most recent)     None        Physical Exam   Constitutional: He is oriented to person, place, and time. He appears well-developed and well-nourished. He is active and cooperative.   HENT:   Head: Normocephalic.   Mouth/Throat: Abnormal dentition (edentulous ).   Eyes: Lids are normal.   Wears glasses    Neck: Normal carotid pulses, no hepatojugular reflux and no JVD present. Carotid bruit is not present.   Cardiovascular: Normal rate, regular rhythm and normal heart sounds.    Pulses:       Radial pulses are 2+ on the right side, and 2+ on the left side.        Dorsalis pedis pulses are 2+ on the right side, and 2+ on the left side.        Posterior tibial pulses are 2+ on the right side, and 2+ on the left side.   Trace edema LLE; no edema " RLE.   Pulmonary/Chest: Effort normal. He has decreased breath sounds in the right lower field and the left lower field.   Abdominal: Normal appearance and bowel sounds are normal.   Neurological: He is alert and oriented to person, place, and time.   Skin: Skin is warm, dry and intact.   Psychiatric: He has a normal mood and affect. His speech is normal and behavior is normal. Judgment and thought content normal. Cognition and memory are normal.       Procedure   Procedures         Assessment/Plan      Diagnosis Plan   1. Bilateral carotid artery disease     2. Hyperlipidemia, unspecified hyperlipidemia type     3. Smoking     4. Hypotension, unspecified hypotension type     5. Palpitations         Return in about 6 months (around 4/12/2018).  Lateral carotid artery disease-patient is on aspirin and statin.  Hyperlipidemia-patient is on Lipitor monitor by PCP.  Smoking-patient was encouraged on smoking cessation.  Hypertension-recheck patient's blood pressure was better.  Patient is asymptomatic.  He has been losing weight due to nausea vomiting possible gastroparesis.  PCP stopped his lisinopril.  Palpitations-they are stable.  Patient will continue his medication regimen.  He will follow-up in 6 months or sooner if any changes.

## 2017-10-12 NOTE — PATIENT INSTRUCTIONS
Palpitations  A palpitation is the feeling that your heartbeat is irregular or is faster than normal. It may feel like your heart is fluttering or skipping a beat. Palpitations are usually not a serious problem. They may be caused by many things, including smoking, caffeine, alcohol, stress, and certain medicines. Although most causes of palpitations are not serious, palpitations can be a sign of a serious medical problem. In some cases, you may need further medical evaluation.  HOME CARE INSTRUCTIONS  Pay attention to any changes in your symptoms. Take these actions to help with your condition:  · Avoid the following:    Caffeinated coffee, tea, soft drinks, diet pills, and energy drinks.    Chocolate.    Alcohol.  · Do not use any tobacco products, such as cigarettes, chewing tobacco, and e-cigarettes. If you need help quitting, ask your health care provider.  · Try to reduce your stress and anxiety. Things that can help you relax include:    Yoga.    Meditation.    Physical activity, such as swimming, jogging, or walking.    Biofeedback. This is a method that helps you learn to use your mind to control things in your body, such as your heartbeats.  · Get plenty of rest and sleep.  · Take over-the-counter and prescription medicines only as told by your health care provider.  · Keep all follow-up visits as told by your health care provider. This is important.  SEEK MEDICAL CARE IF:  · You continue to have a fast or irregular heartbeat after 24 hours.  · Your palpitations occur more often.  SEEK IMMEDIATE MEDICAL CARE IF:  · You have chest pain or shortness of breath.  · You have a severe headache.  · You feel dizzy or you faint.     This information is not intended to replace advice given to you by your health care provider. Make sure you discuss any questions you have with your health care provider.     Document Released: 12/15/2001 Document Revised: 04/10/2017 Document Reviewed: 09/01/2016  [employer]  "Patient Education ©2017 [employer]  You Can Quit Smoking  If you are ready to quit smoking or are thinking about it, congratulations! You have chosen to help yourself be healthier and live longer! There are lots of different ways to quit smoking. Nicotine gum, nicotine patches, a nicotine inhaler, or nicotine nasal spray can help with physical craving. Hypnosis, support groups, and medicines help break the habit of smoking.  TIPS TO GET OFF AND STAY OFF CIGARETTES  · Learn to predict your moods. Do not let a bad situation be your excuse to have a cigarette. Some situations in your life might tempt you to have a cigarette.  · Ask friends and co-workers not to smoke around you.  · Make your home smoke-free.  · Never have \"just one\" cigarette. It leads to wanting another and another. Remind yourself of your decision to quit.  · On a card, make a list of your reasons for not smoking. Read it at least the same number of times a day as you have a cigarette. Tell yourself everyday, \"I do not want to smoke. I choose not to smoke.\"  · Ask someone at home or work to help you with your plan to quit smoking.  · Have something planned after you eat or have a cup of coffee. Take a walk or get other exercise to perk you up. This will help to keep you from overeating.  · Try a relaxation exercise to calm you down and decrease your stress. Remember, you may be tense and nervous the first two weeks after you quit. This will pass.  · Find new activities to keep your hands busy. Play with a pen, coin, or rubber band. Doodle or draw things on paper.  · Brush your teeth right after eating. This will help cut down the craving for the taste of tobacco after meals. You can try mouthwash too.  · Try gum, breath mints, or diet candy to keep something in your mouth.  IF YOU SMOKE AND WANT TO QUIT:  · Do not stock up on cigarettes. Never buy a carton. Wait until one pack is finished before you buy another.  · Never carry cigarettes with you " "at work or at home.  · Keep cigarettes as far away from you as possible. Leave them with someone else.  · Never carry matches or a lighter with you.  · Ask yourself, \"Do I need this cigarette or is this just a reflex?\"  · Bet with someone that you can quit. Put cigarette money in a "2,10E+07" bank every morning. If you smoke, you give up the money. If you do not smoke, by the end of the week, you keep the money.  · Keep trying. It takes 21 days to change a habit!  · Talk to your doctor about using medicines to help you quit. These include nicotine replacement gum, lozenges, or skin patches.     This information is not intended to replace advice given to you by your health care provider. Make sure you discuss any questions you have with your health care provider.     Document Released: 10/14/2010 Document Revised: 03/11/2013 Document Reviewed: 05/03/2016  [employer] Interactive Patient Education ©2017 [employer]  Carotid Artery Disease  The carotid arteries are the two main arteries on either side of the neck that supply blood to the brain. Carotid artery disease, also called carotid artery stenosis, is the narrowing or blockage of one or both carotid arteries. Carotid artery disease increases your risk for a stroke or a transient ischemic attack (TIA). A TIA is an episode in which a waxy, fatty substance that accumulates within the artery (plaque) blocks blood flow to the brain. A TIA is considered a \"warning stroke.\"   CAUSES   · Buildup of plaque inside the carotid arteries (atherosclerosis) (common).  · A weakened outpouching in an artery (aneurysm).  · Inflammation of the carotid artery (arteritis).  · A fibrous growth within the carotid artery (fibromuscular dysplasia).  · Tissue death within the carotid artery due to radiation treatment (post-radiation necrosis).  · Decreased blood flow due to spasms of the carotid artery (vasospasm).  · Separation of the walls of the carotid artery (carotid dissection).  RISK " FACTORS  · High cholesterol (dyslipidemia).    · High blood pressure (hypertension).    · Smoking.    · Obesity.    · Diabetes.    · Family history of cardiovascular disease.    · Inactivity or lack of regular exercise.    · Being male. Men have an increased risk of developing atherosclerosis earlier in life than women.    SYMPTOMS   Carotid artery disease does not cause symptoms.  DIAGNOSIS  Diagnosis of carotid artery disease may include:   · A physical exam. Your health care provider may hear an abnormal sound (bruit) when listening to the carotid arteries.    · Specific tests that look at the blood flow in the carotid arteries. These tests include:      Carotid artery ultrasonography.      Carotid or cerebral angiography.      Computerized tomographic angiography (CTA).      Magnetic resonance angiography (MRA).    TREATMENT   Treatment of carotid artery disease can include a combination of treatments. Treatment options include:  · Surgery. You may have:      A carotid endarterectomy. This is a surgery to remove the blockages in the carotid arteries.      A carotid angioplasty with stenting. This is a nonsurgical interventional procedure. A wire mesh (stent) is used to widen the blocked carotid arteries.    · Medicines to control blood pressure, cholesterol, and reduce blood clotting (antiplatelet therapy).    · Adjusting your diet.    · Lifestyle changes such as:      Quitting smoking.      Exercising as tolerated or as directed by your health care provider.      Controlling and maintaining a good blood pressure.      Keeping cholesterol levels under control.    HOME CARE INSTRUCTIONS   · Take medicines only as directed by your health care provider. Make sure you understand all your medicine instructions. Do not stop your medicines without talking to your health care provider.    · Follow your health care provider's diet instructions. It is important to eat a healthy diet that is low in saturated fats and  includes plenty of fresh fruits, vegetables, and lean meats. High-fat, high-sodium foods as well as foods that are fried, overly processed, or have poor nutritional value should be avoided.  · Maintain a healthy weight.    · Stay physically active. It is recommended that you get at least 30 minutes of activity every day.    · Do not use any tobacco products including cigarettes, chewing tobacco, or electronic cigarettes. If you need help quitting, ask your health care provider.  · Limit alcohol use to:      No more than 2 drinks per day for men.      No more than 1 drink per day for nonpregnant women.    · Do not use illegal drugs.    · Keep all follow-up visits as directed by your health care provider.    SEEK IMMEDIATE MEDICAL CARE IF:   You develop TIA or stroke symptoms. These include:   · Sudden weakness or numbness on one side of the body, such as in the face, arm, or leg.    · Sudden confusion.    · Trouble speaking (aphasia) or understanding.    · Sudden trouble seeing out of one or both eyes.    · Sudden trouble walking.    · Dizziness or feeling like you might faint.    · Loss of balance or coordination.    · Sudden severe headache with no known cause.    · Sudden trouble swallowing (dysphagia).    If you have any of these symptoms, call your local emergency services (911 in [place] Do not drive yourself to the clinic or hospital. This is a medical emergency.      This information is not intended to replace advice given to you by your health care provider. Make sure you discuss any questions you have with your health care provider.     Document Released: 03/11/2013 Document Revised: 01/08/2016 Document Reviewed: 06/18/2014  [employer] Interactive Patient Education ©2017 [employer]

## 2018-03-10 DIAGNOSIS — I10 ESSENTIAL HYPERTENSION: ICD-10-CM

## 2018-03-10 DIAGNOSIS — R00.2 PALPITATIONS: ICD-10-CM

## 2018-03-12 RX ORDER — ISOSORBIDE MONONITRATE 30 MG/1
TABLET, EXTENDED RELEASE ORAL
Qty: 30 TABLET | Refills: 0 | OUTPATIENT
Start: 2018-03-12

## 2018-04-11 ENCOUNTER — OFFICE VISIT (OUTPATIENT)
Dept: CARDIOLOGY | Facility: CLINIC | Age: 48
End: 2018-04-11

## 2018-04-11 VITALS
SYSTOLIC BLOOD PRESSURE: 100 MMHG | WEIGHT: 147.8 LBS | HEART RATE: 73 BPM | DIASTOLIC BLOOD PRESSURE: 57 MMHG | BODY MASS INDEX: 21.89 KG/M2 | OXYGEN SATURATION: 97 % | HEIGHT: 69 IN

## 2018-04-11 DIAGNOSIS — R06.02 SHORTNESS OF BREATH: Primary | ICD-10-CM

## 2018-04-11 DIAGNOSIS — R07.9 CHEST PAIN, UNSPECIFIED TYPE: ICD-10-CM

## 2018-04-11 DIAGNOSIS — I10 ESSENTIAL HYPERTENSION: ICD-10-CM

## 2018-04-11 PROCEDURE — 99214 OFFICE O/P EST MOD 30 MIN: CPT | Performed by: PHYSICIAN ASSISTANT

## 2018-04-11 RX ORDER — ISOSORBIDE MONONITRATE 30 MG/1
TABLET, EXTENDED RELEASE ORAL DAILY
COMMUNITY
Start: 2018-02-12 | End: 2018-06-04

## 2018-04-11 RX ORDER — HYDROCODONE BITARTRATE AND ACETAMINOPHEN 7.5; 325 MG/1; MG/1
TABLET ORAL
COMMUNITY
Start: 2018-04-10

## 2018-04-11 RX ORDER — PREDNISONE 20 MG/1
TABLET ORAL DAILY
COMMUNITY
Start: 2018-04-10

## 2018-04-11 RX ORDER — TIOTROPIUM BROMIDE INHALATION SPRAY 3.12 UG/1
SPRAY, METERED RESPIRATORY (INHALATION) DAILY
COMMUNITY
Start: 2018-02-12

## 2018-04-11 RX ORDER — INSULIN GLARGINE 100 [IU]/ML
90 INJECTION, SOLUTION SUBCUTANEOUS DAILY
COMMUNITY
Start: 2018-04-07

## 2018-04-11 NOTE — PROGRESS NOTES
Problem list     Subjective   Hugo Castaneda is a 48 y.o. male     Chief Complaint   Patient presents with   • Carotid Artery Disease     patient here for 6 mo f/u with chest pressure   • Chest Pain   • Hyperlipidemia   • Hypertension   • Diabetes   • Loss of Consciousness     this am       [facility]      PROBLEM LIST:   1.  Chronic chest pain  1.1 low risk stress test October 2015 and 2017 with no ischemia  2.  Preserved systolic function  3.  Chronic lung disease with history of tuberculosis  4.  Chronic palpitations    4.1 event monitor July 11 to  July 24, 2017 demonstrated sinus tachycardia with no significant arrhythmia  5.  Carotid artery stenosis   5.1 carotid duplex October 2017 demonstrated nonobstructive disease on the right was approximately 50% left internal carotid artery stenosis with antegrade vertebral flow  6.  Chronic low back pain  7.  Diabetes mellitus type 2  8.  Dyslipidemia  8.1 intolerant to statins because of transaminitis  9.  History of syncope  9.1 negative tilt table test September 2017  9.2 event monitor July 2017 with no significant arrhythmia     Patient is a 48-year-old male that presents back for follow-up.  Patient describes feeling poorly.  He has significant lung disease and is soon to go on oxygen continuously because of hypoxemia.    Patient has chest discomfort but by reviewing prognosis is a chronic issue.  He describes having it for quite some time.  He has substernal pressure diffusely heaviness.  This usually resolves spontaneously.  There appear to be no precipitating or relieving factor.  Shortness of breath is moderate to severe at baseline.  He will occasionally experience PND and orthopnea.  Patient palpitates but nothing that has been progressive or changed from the past.  Patient has history of syncope.  He describes orthostatic lightheadedness and orthostatic syncope in the past.  Otherwise is doing well    Outpatient Encounter Prescriptions as of 4/11/2018    Medication Sig Dispense Refill   • aspirin 81 MG EC tablet Take 81 mg by mouth Daily.     • atorvastatin (LIPITOR) 20 MG tablet Take 20 mg by mouth Every Night.     • BREO ELLIPTA 200-25 MCG/INH aerosol powder  2 puffs 4 times a day     • clopidogrel (PLAVIX) 75 MG tablet Take 75 mg by mouth Daily.     • furosemide (LASIX) 20 MG tablet Take 1 tablet by mouth Daily As Needed (edema). 30 tablet 3   • gabapentin (NEURONTIN) 600 MG tablet Take 600 mg by mouth 3 (Three) Times a Day.     • HUMALOG KWIKPEN 100 UNIT/ML solution pen-injector 20 Units 4 (Four) Times a Day. Also per sliding scale     • HYDROcodone-acetaminophen (NORCO) 7.5-325 MG per tablet Prn     • Insulin Glargine (BASAGLAR KWIKPEN) 100 UNIT/ML injection pen 90 Units Daily.     • isosorbide mononitrate (IMDUR) 30 MG 24 hr tablet Daily.     • meloxicam (MOBIC) 15 MG tablet Take 15 mg by mouth Daily.     • metFORMIN (GLUCOPHAGE) 1000 MG tablet Take 1,000 mg by mouth 2 (Two) Times a Day.     • pantoprazole (PROTONIX) 40 MG EC tablet Take 40 mg by mouth Daily.     • predniSONE (DELTASONE) 20 MG tablet Daily. To start today     • SPIRIVA RESPIMAT 2.5 MCG/ACT aerosol solution inhaler Daily.     • SYMBICORT 160-4.5 MCG/ACT inhaler      • tiZANidine (ZANAFLEX) 4 MG tablet Take 4 mg by mouth 3 (Three) Times a Day.     • VENTOLIN  (90 BASE) MCG/ACT inhaler      • [DISCONTINUED] amitriptyline (ELAVIL) 25 MG tablet Take 25 mg by mouth Every Night.     • [DISCONTINUED] LANTUS SOLOSTAR 100 UNIT/ML injection pen        No facility-administered encounter medications on file as of 4/11/2018.        Review of patient's allergies indicates no known allergies.    Past Medical History:   Diagnosis Date   • Cirrhosis of liver    • Diabetes mellitus    • Hyperlipidemia    • Hypertension        Social History     Social History   • Marital status: Single     Spouse name: N/A   • Number of children: N/A   • Years of education: N/A     Occupational History   • Not on file.  "    Social History Main Topics   • Smoking status: Current Every Day Smoker     Packs/day: 1.00     Types: Cigarettes   • Smokeless tobacco: Never Used      Comment: 1 pack every 2 days   • Alcohol use No   • Drug use: No   • Sexual activity: Defer     Other Topics Concern   • Not on file     Social History Narrative   • No narrative on file       Family History   Problem Relation Age of Onset   • Cancer Mother    • Diabetes Mother    • Heart attack Father    • Cancer Brother    • Diabetes Brother        Review of Systems   Constitutional: Positive for fatigue.   HENT:        Hoarseness   Eyes: Positive for visual disturbance (wears glasses).   Respiratory: Positive for shortness of breath.    Cardiovascular: Positive for chest pain, palpitations and leg swelling.   Gastrointestinal: Negative.    Endocrine: Negative.    Genitourinary: Positive for difficulty urinating (flow issues).   Musculoskeletal: Positive for arthralgias and myalgias.   Skin: Negative.    Allergic/Immunologic: Positive for food allergies (eggs).   Neurological: Positive for dizziness, syncope (this am), light-headedness and numbness (upper legs).   Hematological: Bruises/bleeds easily.   Psychiatric/Behavioral: Positive for sleep disturbance.       Objective   Vitals:    04/11/18 0859   BP: 100/57   BP Location: Left arm   Patient Position: Sitting   Pulse: 73   SpO2: 97%   Weight: 67 kg (147 lb 12.8 oz)   Height: 175.3 cm (69\")      /57 (BP Location: Left arm, Patient Position: Sitting)   Pulse 73   Ht 175.3 cm (69\")   Wt 67 kg (147 lb 12.8 oz)   SpO2 97%   BMI 21.83 kg/m²     Lab Results (most recent)     None          Physical Exam   Constitutional: He is oriented to person, place, and time. He appears well-developed and well-nourished. No distress.   HENT:   Head: Normocephalic and atraumatic.   Eyes: EOM are normal. Pupils are equal, round, and reactive to light.   Neck: No JVD present.   Cardiovascular: Normal rate, regular " rhythm, normal heart sounds and intact distal pulses.  Exam reveals no gallop and no friction rub.    No murmur heard.  Pulmonary/Chest: Effort normal and breath sounds normal. No respiratory distress. He has no wheezes. He has no rales. He exhibits no tenderness.   Musculoskeletal: Normal range of motion. He exhibits no edema.   Neurological: He is alert and oriented to person, place, and time. No cranial nerve deficit.   Skin: Skin is warm and dry. No rash noted. No erythema. No pallor.   Psychiatric: He has a normal mood and affect. His behavior is normal.       Procedure   Procedures       Assessment/Plan     Problems Addressed this Visit        Cardiovascular and Mediastinum    Essential hypertension    Relevant Orders    Stress Test With Myocardial Perfusion One Day    Adult Transthoracic Echo Complete W/ Cont if Necessary Per Protocol       Respiratory    Shortness of breath - Primary    Relevant Orders    Stress Test With Myocardial Perfusion One Day    Adult Transthoracic Echo Complete W/ Cont if Necessary Per Protocol       Nervous and Auditory    Chest pain    Relevant Orders    Stress Test With Myocardial Perfusion One Day    Adult Transthoracic Echo Complete W/ Cont if Necessary Per Protocol      Other Visit Diagnoses    None.             Recommendation  1.  Because of patient's chest discomfort and significant dyspnea, would like to schedule for an ischemia assessment.  Nuclear testing will be ordered because of comorbidities.  He is unable to walk on treadmill.  We will get echocardiogram to evaluate LV function and assess filling pressures.  We will look for any cardiac source which could be worsening his dyspnea.  2.  Otherwise he is on appropriate medications.  He describes having nitroglycerin available for chest pain.  Any chest pain not resolved by nitroglycerin, he will go to the ER.  3.  We discussed repeating an event monitor because of history of syncope although it appears to be orthostatic  in nature.  He does not want to have this done at this time.  We will see him back for follow-up as scheduled.  Follow-up primary as scheduled       I advised the patient of the risks in continuing to use tobacco, and I provided this patient with smoking cessation educational materials.    During this visit, I spent < 3 minutes counseling the patient regarding smoking cessation.     Patient's Body mass index is 21.83 kg/m². BMI is within normal parameters. No follow-up required.       Electronically signed by:

## 2018-05-02 ENCOUNTER — HOSPITAL ENCOUNTER (OUTPATIENT)
Dept: CARDIOLOGY | Facility: HOSPITAL | Age: 48
Discharge: HOME | End: 2018-05-02

## 2018-05-02 ENCOUNTER — APPOINTMENT (OUTPATIENT)
Dept: CARDIOLOGY | Facility: HOSPITAL | Age: 48
End: 2018-05-02

## 2018-05-02 ENCOUNTER — OUTSIDE FACILITY SERVICE (OUTPATIENT)
Dept: CARDIOLOGY | Facility: CLINIC | Age: 48
End: 2018-05-02

## 2018-05-02 LAB
MAXIMAL PREDICTED HEART RATE: 170 BPM
MAXIMAL PREDICTED HEART RATE: 170 BPM
STRESS TARGET HR: 145 BPM
STRESS TARGET HR: 145 BPM

## 2018-05-02 PROCEDURE — 93018 CV STRESS TEST I&R ONLY: CPT | Performed by: INTERNAL MEDICINE

## 2018-05-02 PROCEDURE — 93306 TTE W/DOPPLER COMPLETE: CPT

## 2018-05-02 PROCEDURE — 0 TECHNETIUM SESTAMIBI: Performed by: INTERNAL MEDICINE

## 2018-05-02 PROCEDURE — 93017 CV STRESS TEST TRACING ONLY: CPT

## 2018-05-02 PROCEDURE — A9500 TC99M SESTAMIBI: HCPCS | Performed by: INTERNAL MEDICINE

## 2018-05-02 PROCEDURE — 78452 HT MUSCLE IMAGE SPECT MULT: CPT

## 2018-05-02 PROCEDURE — 93306 TTE W/DOPPLER COMPLETE: CPT | Performed by: INTERNAL MEDICINE

## 2018-05-02 PROCEDURE — 78452 HT MUSCLE IMAGE SPECT MULT: CPT | Performed by: INTERNAL MEDICINE

## 2018-05-02 PROCEDURE — 25010000002 REGADENOSON 0.4 MG/5ML SOLUTION: Performed by: INTERNAL MEDICINE

## 2018-05-02 RX ADMIN — REGADENOSON 0.4 MG: 0.08 INJECTION, SOLUTION INTRAVENOUS at 10:30

## 2018-05-02 RX ADMIN — TECHNETIUM TC 99M SESTAMIBI 1 DOSE: 1 INJECTION INTRAVENOUS at 10:30

## 2018-05-08 ENCOUNTER — TELEPHONE (OUTPATIENT)
Dept: CARDIOLOGY | Facility: CLINIC | Age: 48
End: 2018-05-08

## 2018-05-08 NOTE — TELEPHONE ENCOUNTER
PATIENT AWARE OF ECHO AND STRESS TEST RESULTS, AND TO KEEP F/U APPT. ON 6-4-18.  WOLF SUAREZ      ----- Message from Aby Hunt sent at 5/8/2018 10:58 AM EDT -----  Contact: PATIENT  THE PATIENT CALLED FOR HIS TEST RESULTS HE MISSED THE CALL BACK YESTERDAY.  HE CAN BE REACHED [phone].  THANKS

## 2018-06-04 ENCOUNTER — OFFICE VISIT (OUTPATIENT)
Dept: CARDIOLOGY | Facility: CLINIC | Age: 48
End: 2018-06-04

## 2018-06-04 VITALS
WEIGHT: 144 LBS | OXYGEN SATURATION: 99 % | BODY MASS INDEX: 21.33 KG/M2 | SYSTOLIC BLOOD PRESSURE: 105 MMHG | HEIGHT: 69 IN | HEART RATE: 86 BPM | DIASTOLIC BLOOD PRESSURE: 58 MMHG

## 2018-06-04 DIAGNOSIS — R94.39 ABNORMAL STRESS TEST: ICD-10-CM

## 2018-06-04 DIAGNOSIS — R06.02 SHORTNESS OF BREATH: Primary | ICD-10-CM

## 2018-06-04 DIAGNOSIS — R07.9 CHEST PAIN, UNSPECIFIED TYPE: ICD-10-CM

## 2018-06-04 PROCEDURE — 99214 OFFICE O/P EST MOD 30 MIN: CPT | Performed by: PHYSICIAN ASSISTANT

## 2018-06-04 RX ORDER — RANOLAZINE 500 MG/1
500 TABLET, EXTENDED RELEASE ORAL 2 TIMES DAILY
Qty: 60 TABLET | Refills: 6 | Status: SHIPPED | OUTPATIENT
Start: 2018-06-04 | End: 2018-09-11

## 2018-06-04 RX ORDER — NITROGLYCERIN 0.4 MG/1
TABLET SUBLINGUAL
Qty: 100 TABLET | Refills: 11 | Status: SHIPPED | OUTPATIENT
Start: 2018-06-04

## 2018-06-04 NOTE — PROGRESS NOTES
Problem list     Subjective   Hugo Castaneda is a 48 y.o. male     Chief Complaint   Patient presents with   • Palpitations     Here for f/u on testing   • Hypertension   • Hyperlipidemia   • Diabetes   • Cirrhosis       HPI    PROBLEM LIST:   1.  Chronic chest pain  1.1 low risk stress test October 2015 and 2017 with no ischemia  1.2 stress test May 2018 demonstrates no evidence ischemia but elevated TID   1.3 patient with recurrent chest pain on antianginal therapy  2.  Preserved systolic function  3.  Chronic lung disease with history of tuberculosis  4.  Chronic palpitations    4.1 event monitor July 11 to  July 24, 2017 demonstrated sinus tachycardia with no significant arrhythmia  5.  Carotid artery stenosis   5.1 carotid duplex October 2017 demonstrated nonobstructive disease on the right was approximately 50% left internal carotid artery stenosis with antegrade vertebral flow  6.  Chronic low back pain  7.  Diabetes mellitus type 2  8.  Dyslipidemia  8.1 intolerant to statins because of transaminitis  9.  History of syncope  9.1 negative tilt table test September 2017  9.2 event monitor July 2017 with no significant arrhythmia    Patient is a 48-year-old male that presents back for follow-up.  Patient has had progressive and profound chest discomfort.  He describes substernal pressure and an aching sensation that will occur.  Patient has been taking nitroglycerin frequently.  His pain has been there despite taking isosorbide to help with his chest discomfort.  Patient describes being very concerned because of a significant family history of premature coronary artery disease.  Patient has mild levels of dyspnea when exerting.  Nothing that has been progressive.  No PND orthopnea.  Patient does palpitate on occasion.  Otherwise, patient is doing well.      Outpatient Encounter Prescriptions as of 6/4/2018   Medication Sig Dispense Refill   • aspirin 81 MG EC tablet Take 81 mg by mouth Daily.     •  atorvastatin (LIPITOR) 20 MG tablet Take 20 mg by mouth Every Night.     • BREO ELLIPTA 200-25 MCG/INH aerosol powder  2 puffs 4 times a day     • clopidogrel (PLAVIX) 75 MG tablet Take 75 mg by mouth Daily.     • furosemide (LASIX) 20 MG tablet Take 1 tablet by mouth Daily As Needed (edema). 30 tablet 3   • gabapentin (NEURONTIN) 600 MG tablet Take 600 mg by mouth 3 (Three) Times a Day.     • HUMALOG KWIKPEN 100 UNIT/ML solution pen-injector 20 Units 4 (Four) Times a Day. Also per sliding scale     • HYDROcodone-acetaminophen (NORCO) 7.5-325 MG per tablet Prn     • Insulin Glargine (BASAGLAR KWIKPEN) 100 UNIT/ML injection pen 90 Units Daily.     • meloxicam (MOBIC) 15 MG tablet Take 15 mg by mouth Daily.     • metFORMIN (GLUCOPHAGE) 1000 MG tablet Take 1,000 mg by mouth 2 (Two) Times a Day.     • pantoprazole (PROTONIX) 40 MG EC tablet Take 40 mg by mouth Daily.     • predniSONE (DELTASONE) 20 MG tablet Daily. To start today     • SPIRIVA RESPIMAT 2.5 MCG/ACT aerosol solution inhaler Daily.     • SYMBICORT 160-4.5 MCG/ACT inhaler Inhale 2 puffs 2 (Two) Times a Day.     • tiZANidine (ZANAFLEX) 4 MG tablet Take 4 mg by mouth 3 (Three) Times a Day.     • VENTOLIN  (90 BASE) MCG/ACT inhaler Inhale 2 puffs Every 4 (Four) Hours As Needed.     • [DISCONTINUED] isosorbide mononitrate (IMDUR) 30 MG 24 hr tablet Daily.     • nitroglycerin (NITROSTAT) 0.4 MG SL tablet 1 under the tongue as needed for angina, may repeat q5mins for up three doses 100 tablet 11   • ranolazine (RANEXA) 500 MG 12 hr tablet Take 1 tablet by mouth 2 (Two) Times a Day. 60 tablet 6     No facility-administered encounter medications on file as of 6/4/2018.        Patient has no known allergies.    Past Medical History:   Diagnosis Date   • Cirrhosis of liver    • Diabetes mellitus    • Hyperlipidemia    • Hypertension        Social History     Social History   • Marital status: Single     Spouse name: N/A   • Number of children: N/A   • Years of  "education: N/A     Occupational History   • Not on file.     Social History Main Topics   • Smoking status: Current Every Day Smoker     Packs/day: 1.00     Types: Cigarettes   • Smokeless tobacco: Never Used      Comment: 1 pack every 2 days   • Alcohol use No   • Drug use: No   • Sexual activity: Defer     Other Topics Concern   • Not on file     Social History Narrative   • No narrative on file       Family History   Problem Relation Age of Onset   • Cancer Mother    • Diabetes Mother    • Heart attack Father    • Cancer Brother    • Diabetes Brother        Review of Systems   Constitutional: Positive for fatigue.   HENT: Positive for postnasal drip.    Eyes: Positive for visual disturbance (glasses).   Respiratory: Positive for shortness of breath.    Cardiovascular: Positive for chest pain, palpitations and leg swelling.   Gastrointestinal: Positive for abdominal pain (HX cirrhosis).   Endocrine: Negative.    Genitourinary: Negative.    Musculoskeletal: Positive for arthralgias and myalgias.   Skin: Negative.    Allergic/Immunologic: Positive for environmental allergies.   Neurological: Positive for dizziness, syncope, light-headedness and numbness (fingers/hands/forearms).   Hematological: Bruises/bleeds easily.   Psychiatric/Behavioral: Positive for sleep disturbance.   All other systems reviewed and are negative.      Objective   Vitals:    06/04/18 0910   BP: 105/58   BP Location: Left arm   Patient Position: Sitting   Pulse: 86   SpO2: 99%   Weight: 65.3 kg (144 lb)   Height: 175.3 cm (69.02\")      /58 (BP Location: Left arm, Patient Position: Sitting)   Pulse 86   Ht 175.3 cm (69.02\")   Wt 65.3 kg (144 lb) Comment: small scale  SpO2 99%   BMI 21.26 kg/m²     Lab Results (most recent)     None          Physical Exam   Constitutional: He is oriented to person, place, and time. He appears well-developed and well-nourished. No distress.   HENT:   Head: Normocephalic and atraumatic.   Eyes: EOM are " normal. Pupils are equal, round, and reactive to light.   Neck: No JVD present.   Cardiovascular: Normal rate, regular rhythm, normal heart sounds and intact distal pulses.  Exam reveals no gallop and no friction rub.    No murmur heard.  Pulmonary/Chest: Effort normal and breath sounds normal. No respiratory distress. He has no wheezes. He has no rales. He exhibits no tenderness.   Musculoskeletal: Normal range of motion. He exhibits no edema.   Neurological: He is alert and oriented to person, place, and time. No cranial nerve deficit.   Skin: Skin is warm and dry. No rash noted. No erythema. No pallor.   Psychiatric: He has a normal mood and affect. His behavior is normal.   Nursing note and vitals reviewed.      Procedure   Procedures       Assessment/Plan     Problems Addressed this Visit        Respiratory    Shortness of breath - Primary    Relevant Orders    Basic Metabolic Panel    Cardiac catheterization       Nervous and Auditory    Chest pain    Relevant Orders    Basic Metabolic Panel    Cardiac catheterization      Other Visit Diagnoses     Abnormal stress test        Relevant Orders    Basic Metabolic Panel    Cardiac catheterization            Recommendations  1.  Patient with significant chest discomfort despite negative stress test.  Patient takes nitroglycerin frequently.  He is on antianginal therapy with continued symptoms.  Risk factors include dyslipidemia, insulin-dependent diabetes mellitus, family history of premature coronary artery disease and chronic tobacco use.  Patient also has history of carotid artery stenosis.  Therefore, I feel definitive evaluation is warranted because of the profound level of patient's symptoms.  We'll schedule for left heart catheterization.  I will stop patient's isosorbide because of hypotension and started on Ranexa.  We will schedule catheterization see patient back for follow-up as scheduled.  Any chest pain or supple nitroglycerin will go to the ER.   Follow-up primary as scheduled         I advised the patient of the risks in continuing to use tobacco, and I provided this patient with smoking cessation educational materials.    During this visit, I spent <3 minutes counseling the patient regarding smoking cessation.     Patient's Body mass index is 21.26 kg/m². BMI is within normal parameters. No follow-up required.       Electronically signed by:

## 2018-06-18 ENCOUNTER — APPOINTMENT (OUTPATIENT)
Dept: LAB | Facility: HOSPITAL | Age: 48
End: 2018-06-18

## 2018-06-18 PROCEDURE — 80048 BASIC METABOLIC PNL TOTAL CA: CPT | Performed by: PHYSICIAN ASSISTANT

## 2018-06-19 LAB
ANION GAP SERPL CALCULATED.3IONS-SCNC: 3.7 MMOL/L (ref 3.6–11.2)
BUN BLD-MCNC: 12 MG/DL (ref 7–21)
BUN/CREAT SERPL: 14 (ref 7–25)
CALCIUM SPEC-SCNC: 8.9 MG/DL (ref 7.7–10)
CHLORIDE SERPL-SCNC: 106 MMOL/L (ref 99–112)
CO2 SERPL-SCNC: 25.3 MMOL/L (ref 24.3–31.9)
CREAT BLD-MCNC: 0.86 MG/DL (ref 0.43–1.29)
GFR SERPL CREATININE-BSD FRML MDRD: 94 ML/MIN/1.73
GLUCOSE BLD-MCNC: 286 MG/DL (ref 70–110)
OSMOLALITY SERPL CALC.SUM OF ELEC: 280.3 MOSM/KG (ref 273–305)
POTASSIUM BLD-SCNC: 4.5 MMOL/L (ref 3.5–5.3)
SODIUM BLD-SCNC: 135 MMOL/L (ref 135–153)

## 2018-06-28 ENCOUNTER — OUTSIDE FACILITY SERVICE (OUTPATIENT)
Dept: CARDIOLOGY | Facility: CLINIC | Age: 48
End: 2018-06-28

## 2018-06-28 PROCEDURE — 93458 L HRT ARTERY/VENTRICLE ANGIO: CPT | Performed by: INTERNAL MEDICINE

## 2018-07-09 ENCOUNTER — OFFICE VISIT (OUTPATIENT)
Dept: CARDIOLOGY | Facility: CLINIC | Age: 48
End: 2018-07-09

## 2018-07-09 VITALS
OXYGEN SATURATION: 97 % | DIASTOLIC BLOOD PRESSURE: 76 MMHG | SYSTOLIC BLOOD PRESSURE: 132 MMHG | BODY MASS INDEX: 22.07 KG/M2 | HEART RATE: 120 BPM | WEIGHT: 149 LBS | HEIGHT: 69 IN

## 2018-07-09 DIAGNOSIS — R42 DIZZINESS: ICD-10-CM

## 2018-07-09 DIAGNOSIS — R07.9 CHEST PAIN, UNSPECIFIED TYPE: ICD-10-CM

## 2018-07-09 DIAGNOSIS — R55 SYNCOPE, UNSPECIFIED SYNCOPE TYPE: Primary | ICD-10-CM

## 2018-07-09 DIAGNOSIS — R29.3 POSTURAL IMBALANCE: ICD-10-CM

## 2018-07-09 DIAGNOSIS — R06.02 SHORTNESS OF BREATH: ICD-10-CM

## 2018-07-09 PROCEDURE — 99214 OFFICE O/P EST MOD 30 MIN: CPT | Performed by: PHYSICIAN ASSISTANT

## 2018-07-09 RX ORDER — LORAZEPAM 0.5 MG/1
TABLET ORAL 2 TIMES DAILY
COMMUNITY
Start: 2018-07-06

## 2018-07-09 NOTE — PROGRESS NOTES
Problem list     Subjective   Hugo Castaneda is a 48 y.o. male     Chief Complaint   Patient presents with   • Hypertension     Here for heart cath. f/u   • Hyperlipidemia   • Palpitations   • Carotid Artery Disease   • Diabetes       HPI    PROBLEM LIST:   1.  Chronic chest pain  1.1 low risk stress test October 2015 and 2017 with no ischemia  1.2 stress test May 2018 demonstrates no evidence ischemia but elevated TID   1.3 cardiac catheterization June 2080 because of continued symptoms on antianginal therapy demonstrated nonobstructive disease.  Most notable disease in the diagonal approaching 70% with medical management recommended  2.  Preserved systolic function  3.  Chronic lung disease with history of tuberculosis  4.  Chronic palpitations    4.1 event monitor July 11 to  July 24, 2017 demonstrated sinus tachycardia with no significant arrhythmia  5.  Carotid artery stenosis   5.1 carotid duplex October 2017 demonstrated nonobstructive disease on the right was approximately 50% left internal carotid artery stenosis with antegrade vertebral flow  6.  Chronic low back pain  7.  Diabetes mellitus type 2  8.  Dyslipidemia  8.1 intolerant to statins because of transaminitis  9.  History of syncope  9.1 negative tilt table test September 2017  9.2 event monitor July 2017 with no significant arrhythmia    Patient is a 48-year-old male that presents back for follow-up.  Patient recently had an episode of syncope.  Patient describes standing to go do something.  After a few seconds he had a syncopal episode.  He subsequently injured his shoulder.  There was no post ictal state or loss of bowel or bladder function.  Patient has been followed by primary and according to the patient has had scans performed including a prior CT scan of his head.  Patient describes one other episode of syncope in the distant past when she got dizzy.  It did not appear to be related to position changes.  He did not express chest pain or  palpitations prior to his syncopal episode.    He continues to have chest pain.  He continues to have slight pressure.  He has mild levels of dyspnea but no significant exertional dyspnea.  No PND orthopnea.  Otherwise is doing well       Outpatient Encounter Prescriptions as of 7/9/2018   Medication Sig Dispense Refill   • aspirin 81 MG EC tablet Take 81 mg by mouth Daily.     • atorvastatin (LIPITOR) 20 MG tablet Take 20 mg by mouth Every Night.     • BREO ELLIPTA 200-25 MCG/INH aerosol powder  2 puffs 4 times a day     • furosemide (LASIX) 20 MG tablet Take 1 tablet by mouth Daily As Needed (edema). 30 tablet 3   • gabapentin (NEURONTIN) 600 MG tablet Take 600 mg by mouth 3 (Three) Times a Day.     • HUMALOG KWIKPEN 100 UNIT/ML solution pen-injector 20 Units 4 (Four) Times a Day. Also per sliding scale     • HYDROcodone-acetaminophen (NORCO) 7.5-325 MG per tablet Prn     • Insulin Glargine (BASAGLAR KWIKPEN) 100 UNIT/ML injection pen 90 Units Daily.     • LORazepam (ATIVAN) 0.5 MG tablet 2 (Two) Times a Day.     • meloxicam (MOBIC) 15 MG tablet Take 15 mg by mouth Daily.     • metFORMIN (GLUCOPHAGE) 1000 MG tablet Take 1,000 mg by mouth 2 (Two) Times a Day.     • pantoprazole (PROTONIX) 40 MG EC tablet Take 40 mg by mouth Daily.     • predniSONE (DELTASONE) 20 MG tablet Daily. To start today     • ranolazine (RANEXA) 500 MG 12 hr tablet Take 1 tablet by mouth 2 (Two) Times a Day. 60 tablet 6   • SPIRIVA RESPIMAT 2.5 MCG/ACT aerosol solution inhaler Daily.     • SYMBICORT 160-4.5 MCG/ACT inhaler Inhale 2 puffs 2 (Two) Times a Day.     • tiZANidine (ZANAFLEX) 4 MG tablet Take 4 mg by mouth 3 (Three) Times a Day.     • VENTOLIN  (90 BASE) MCG/ACT inhaler Inhale 2 puffs Every 4 (Four) Hours As Needed.     • nitroglycerin (NITROSTAT) 0.4 MG SL tablet 1 under the tongue as needed for angina, may repeat q5mins for up three doses 100 tablet 11   • [DISCONTINUED] clopidogrel (PLAVIX) 75 MG tablet Take 75 mg by  mouth Daily.       No facility-administered encounter medications on file as of 7/9/2018.        Patient has no known allergies.    Past Medical History:   Diagnosis Date   • Cirrhosis of liver (CMS/HCC)    • Diabetes mellitus (CMS/HCC)    • Hyperlipidemia    • Hypertension        Social History     Social History   • Marital status: Single     Spouse name: N/A   • Number of children: N/A   • Years of education: N/A     Occupational History   • Not on file.     Social History Main Topics   • Smoking status: Current Every Day Smoker     Packs/day: 1.00     Types: Cigarettes   • Smokeless tobacco: Never Used      Comment: 1 pack every 2 days   • Alcohol use No   • Drug use: No   • Sexual activity: Defer     Other Topics Concern   • Not on file     Social History Narrative   • No narrative on file       Family History   Problem Relation Age of Onset   • Cancer Mother    • Diabetes Mother    • Heart attack Father    • Cancer Brother    • Diabetes Brother        Review of Systems   Constitutional: Positive for fatigue.   HENT: Positive for nosebleeds.    Eyes: Positive for visual disturbance (glasses).   Respiratory: Positive for shortness of breath.    Cardiovascular: Positive for chest pain, palpitations and leg swelling.   Gastrointestinal: Negative.    Endocrine: Negative.    Genitourinary: Negative.    Musculoskeletal: Positive for arthralgias and myalgias.   Skin: Negative.    Allergic/Immunologic: Positive for environmental allergies.   Neurological: Positive for dizziness, syncope, light-headedness and numbness (bilat. LE).   Hematological: Bruises/bleeds easily.   Psychiatric/Behavioral: Positive for sleep disturbance.   All other systems reviewed and are negative.      Objective   Vitals:    07/09/18 1113 07/09/18 1155 07/09/18 1156 07/09/18 1157   BP: 131/74 139/79 137/79 132/76   BP Location: Left arm Left arm Left arm Left arm   Patient Position: Sitting Lying Sitting Standing   Pulse: 115 108 109 120  "  SpO2: 97%      Weight: 67.6 kg (149 lb)      Height: 175.3 cm (69.02\")         /76 (BP Location: Left arm, Patient Position: Standing)   Pulse 120   Ht 175.3 cm (69.02\")   Wt 67.6 kg (149 lb)   SpO2 97%   BMI 21.99 kg/m²     Lab Results (most recent)     None          Physical Exam   Constitutional: He is oriented to person, place, and time. He appears well-developed and well-nourished. No distress.   HENT:   Head: Normocephalic and atraumatic.   Eyes: EOM are normal. Pupils are equal, round, and reactive to light.   Neck: No JVD present.   Cardiovascular: Normal rate, regular rhythm, normal heart sounds and intact distal pulses.  Exam reveals no gallop and no friction rub.    No murmur heard.  Pulmonary/Chest: Effort normal and breath sounds normal. No respiratory distress. He has no wheezes. He has no rales. He exhibits no tenderness.   Musculoskeletal: Normal range of motion. He exhibits no edema.   Neurological: He is alert and oriented to person, place, and time. No cranial nerve deficit.   Skin: Skin is warm and dry. No rash noted. No erythema. No pallor.   Psychiatric: He has a normal mood and affect. His behavior is normal.   Nursing note and vitals reviewed.      Procedure   Procedures       Assessment/Plan     Problems Addressed this Visit        Cardiovascular and Mediastinum    Syncope - Primary    Relevant Orders    Cardiac Event Monitor    D-dimer, Quantitative    Accessories: Walker, Cane or Crutch       Respiratory    Shortness of breath    Relevant Orders    Cardiac Event Monitor    D-dimer, Quantitative       Nervous and Auditory    Chest pain    Relevant Orders    Cardiac Event Monitor    D-dimer, Quantitative       Other    Postural imbalance    Relevant Orders    Cardiac Event Monitor    D-dimer, Quantitative    Accessories: Walker, Cane or Crutch    Dizziness    Relevant Orders    Cardiac Event Monitor    D-dimer, Quantitative    Accessories: Walker, Cane or Crutch    "         Recommendation  1.  Patient with 2 episodes of syncope and only one recently.  He has good LV function and nonobstructive coronary artery disease.  He has worked heart monitors in the past but like to schedule for an extended event monitor to include 30 days.  We did orthostatic blood pressures in the office and they are negative.  I would like to get d-dimer because of chest pain and syncope.  2.  Patient request a prescription for a cane which we will prescribed.  Otherwise, we will like to see him back for follow-up on testing.  We do not recommend any driving at this time because the patient's symptoms until we can obtain cardiac results.  We will see back for follow-up on testing.  Follow-up primary as scheduled         I advised Arias of the risks of continuing to use tobacco, and I provided him with tobacco cessation educational materials in the After Visit Summary.     During this visit, I spent <3 minutes counseling the patient regarding tobacco cessation.     Patient's Body mass index is 21.99 kg/m². BMI is within normal parameters. No follow-up required.       Electronically signed by:

## 2018-08-09 ENCOUNTER — RESULTS ENCOUNTER (OUTPATIENT)
Dept: CARDIOLOGY | Facility: CLINIC | Age: 48
End: 2018-08-09

## 2018-08-09 DIAGNOSIS — R29.3 POSTURAL IMBALANCE: ICD-10-CM

## 2018-08-09 DIAGNOSIS — R42 DIZZINESS: ICD-10-CM

## 2018-08-09 DIAGNOSIS — R06.02 SHORTNESS OF BREATH: ICD-10-CM

## 2018-08-09 DIAGNOSIS — R07.9 CHEST PAIN, UNSPECIFIED TYPE: ICD-10-CM

## 2018-08-09 DIAGNOSIS — R55 SYNCOPE, UNSPECIFIED SYNCOPE TYPE: ICD-10-CM

## 2018-08-16 ENCOUNTER — OUTSIDE FACILITY SERVICE (OUTPATIENT)
Dept: CARDIOLOGY | Facility: CLINIC | Age: 48
End: 2018-08-16

## 2018-08-16 PROCEDURE — 93272 ECG/REVIEW INTERPRET ONLY: CPT | Performed by: INTERNAL MEDICINE

## 2018-08-20 ENCOUNTER — TELEPHONE (OUTPATIENT)
Dept: CARDIOLOGY | Facility: CLINIC | Age: 48
End: 2018-08-20

## 2018-09-11 ENCOUNTER — OFFICE VISIT (OUTPATIENT)
Dept: CARDIOLOGY | Facility: CLINIC | Age: 48
End: 2018-09-11

## 2018-09-11 VITALS
WEIGHT: 133.8 LBS | HEART RATE: 118 BPM | HEIGHT: 69 IN | DIASTOLIC BLOOD PRESSURE: 87 MMHG | SYSTOLIC BLOOD PRESSURE: 154 MMHG | BODY MASS INDEX: 19.82 KG/M2 | OXYGEN SATURATION: 96 %

## 2018-09-11 DIAGNOSIS — R06.02 SHORTNESS OF BREATH: Primary | ICD-10-CM

## 2018-09-11 DIAGNOSIS — R07.9 CHEST PAIN, UNSPECIFIED TYPE: ICD-10-CM

## 2018-09-11 DIAGNOSIS — R55 SYNCOPE, UNSPECIFIED SYNCOPE TYPE: ICD-10-CM

## 2018-09-11 DIAGNOSIS — R00.2 PALPITATIONS: ICD-10-CM

## 2018-09-11 PROCEDURE — 99214 OFFICE O/P EST MOD 30 MIN: CPT | Performed by: PHYSICIAN ASSISTANT

## 2018-09-11 RX ORDER — RANOLAZINE 1000 MG/1
1000 TABLET, EXTENDED RELEASE ORAL EVERY 12 HOURS SCHEDULED
Qty: 60 TABLET | Refills: 6 | Status: SHIPPED | OUTPATIENT
Start: 2018-09-11

## 2018-09-11 NOTE — PROGRESS NOTES
Problem list     Subjective   Hugo Castaneda is a 48 y.o. male     Chief Complaint   Patient presents with   • Follow-up     presents for monitor f/u   • Chest Pain   • Palpitations   • Hypertension   • Shortness of Breath       HPI      PROBLEM LIST:   1.  Chronic chest pain  1.1 low risk stress test October 2015 and 2017 with no ischemia  1.2 stress test May 2018 demonstrates no evidence ischemia but elevated TID   1.3 cardiac catheterization June 2080 because of continued symptoms on antianginal therapy demonstrated nonobstructive disease.  Most notable disease in the diagonal approaching 70% with medical management recommended  2.  Preserved systolic function  3.  Chronic lung disease with history of tuberculosis  4.  Chronic palpitations    4.1 event monitor July 11 to  July 24, 2017 demonstrated sinus tachycardia with no significant arrhythmia  5.  Carotid artery stenosis   5.1 carotid duplex October 2017 demonstrated nonobstructive disease on the right was approximately 50% left internal carotid artery stenosis with antegrade vertebral flow  6.  Chronic low back pain  7.  Diabetes mellitus type 2  8.  Dyslipidemia  8.1 intolerant to statins because of transaminitis  9.  History of syncope  9.1 negative tilt table test September 2017  9.2 event monitor July 2017 with no significant arrhythmia    Patient is a 48-year-old male that presents back for follow-up.  He has been doing poorly.  He describes having persistent chest discomfort.  He described having to take 2 nitroglycerin's to abort his chest discomfort.    He describes having recurrent syncope.  He is here today to review of the monitor.  The monitor shows no significant arrhythmia.  No significant ectopy identified.  Patient has had recurrent symptoms.  He'll have palpitations and chest discomfort.  He then subsequently will have a syncopal episode.  Some of his symptoms will occasionally be postural in nature.  However, he has had multiple  syncopal episodes which do not Symbicort related to his position change.  He has mild levels of dyspnea but not progressive shortness of breath.  No PND orthopnea.  Otherwise is done well    Outpatient Encounter Prescriptions as of 9/11/2018   Medication Sig Dispense Refill   • aspirin 81 MG EC tablet Take 81 mg by mouth Daily.     • atorvastatin (LIPITOR) 20 MG tablet Take 20 mg by mouth Every Night.     • BREO ELLIPTA 200-25 MCG/INH aerosol powder  2 puffs 4 times a day     • furosemide (LASIX) 20 MG tablet Take 1 tablet by mouth Daily As Needed (edema). 30 tablet 3   • gabapentin (NEURONTIN) 600 MG tablet Take 600 mg by mouth 3 (Three) Times a Day.     • HUMALOG KWIKPEN 100 UNIT/ML solution pen-injector 20 Units 4 (Four) Times a Day. Also per sliding scale     • HYDROcodone-acetaminophen (NORCO) 7.5-325 MG per tablet Prn     • Insulin Glargine (BASAGLAR KWIKPEN) 100 UNIT/ML injection pen 90 Units Daily.     • LORazepam (ATIVAN) 0.5 MG tablet 2 (Two) Times a Day.     • meloxicam (MOBIC) 15 MG tablet Take 15 mg by mouth Daily.     • metFORMIN (GLUCOPHAGE) 1000 MG tablet Take 1,000 mg by mouth 2 (Two) Times a Day.     • nitroglycerin (NITROSTAT) 0.4 MG SL tablet 1 under the tongue as needed for angina, may repeat q5mins for up three doses 100 tablet 11   • pantoprazole (PROTONIX) 40 MG EC tablet Take 40 mg by mouth Daily.     • SPIRIVA RESPIMAT 2.5 MCG/ACT aerosol solution inhaler Daily.     • SYMBICORT 160-4.5 MCG/ACT inhaler Inhale 2 puffs 2 (Two) Times a Day.     • tiZANidine (ZANAFLEX) 4 MG tablet Take 4 mg by mouth 3 (Three) Times a Day.     • VENTOLIN  (90 BASE) MCG/ACT inhaler Inhale 2 puffs Every 4 (Four) Hours As Needed.     • [DISCONTINUED] ranolazine (RANEXA) 500 MG 12 hr tablet Take 1 tablet by mouth 2 (Two) Times a Day. 60 tablet 6   • predniSONE (DELTASONE) 20 MG tablet Daily. To start today     • ranolazine (RANEXA) 1000 MG 12 hr tablet Take 1 tablet by mouth Every 12 (Twelve) Hours. 60 tablet  6     No facility-administered encounter medications on file as of 9/11/2018.        Patient has no known allergies.    Past Medical History:   Diagnosis Date   • Cirrhosis of liver (CMS/HCC)    • Diabetes mellitus (CMS/HCC)    • Hyperlipidemia    • Hypertension        Social History     Social History   • Marital status: Single     Spouse name: N/A   • Number of children: N/A   • Years of education: N/A     Occupational History   • Not on file.     Social History Main Topics   • Smoking status: Current Every Day Smoker     Packs/day: 1.00     Types: Cigarettes   • Smokeless tobacco: Never Used      Comment: 1 pack every 2 days   • Alcohol use No   • Drug use: No   • Sexual activity: Defer     Other Topics Concern   • Not on file     Social History Narrative   • No narrative on file       Family History   Problem Relation Age of Onset   • Cancer Mother    • Diabetes Mother    • Heart attack Father    • Cancer Brother    • Diabetes Brother        Review of Systems   Constitutional: Positive for chills, fatigue and unexpected weight change.   HENT: Positive for dental problem and hearing loss.    Eyes: Positive for visual disturbance (wears glasses).   Respiratory: Positive for shortness of breath (at rest and with any activity).    Cardiovascular: Positive for chest pain (took nitro 4 days ago for precordial pain), palpitations (racing) and leg swelling.   Gastrointestinal: Negative.    Endocrine: Negative.    Genitourinary: Positive for difficulty urinating (low flow).   Musculoskeletal: Positive for arthralgias, back pain, gait problem (uses a cane) and myalgias.        Right shoulder surgery from recent fall   Skin: Negative.    Allergic/Immunologic: Positive for environmental allergies.   Neurological: Positive for dizziness (sporadic episodes), tremors, syncope (most recent episode yesterday; states seeing specialist ), weakness and numbness (legs and feeling like on fire ).   Hematological: Bruises/bleeds  "easily.   Psychiatric/Behavioral: Positive for agitation, confusion and sleep disturbance (wakes up smothering/soa). The patient is nervous/anxious.    All other systems reviewed and are negative.      Objective   Vitals:    09/11/18 1134   BP: 154/87   BP Location: Left arm   Patient Position: Sitting   Pulse: 118   SpO2: 96%   Weight: 60.7 kg (133 lb 12.8 oz)   Height: 175.3 cm (69\")      /87 (BP Location: Left arm, Patient Position: Sitting)   Pulse 118   Ht 175.3 cm (69\")   Wt 60.7 kg (133 lb 12.8 oz)   SpO2 96%   BMI 19.76 kg/m²     Lab Results (most recent)     None          Physical Exam   Constitutional: He is oriented to person, place, and time. He appears well-developed and well-nourished. No distress.   HENT:   Head: Normocephalic and atraumatic.   Eyes: Pupils are equal, round, and reactive to light. EOM are normal.   Neck: No JVD present.   Cardiovascular: Normal rate, regular rhythm, normal heart sounds and intact distal pulses.  Exam reveals no gallop and no friction rub.    No murmur heard.  Pulmonary/Chest: Effort normal and breath sounds normal. No respiratory distress. He has no wheezes. He has no rales. He exhibits no tenderness.   Musculoskeletal: Normal range of motion. He exhibits no edema.   Neurological: He is alert and oriented to person, place, and time. No cranial nerve deficit.   Skin: Skin is warm and dry. No rash noted. No erythema. No pallor.   Psychiatric: He has a normal mood and affect. His behavior is normal.   Nursing note and vitals reviewed.      Procedure   Procedures       Assessment/Plan     Problems Addressed this Visit        Cardiovascular and Mediastinum    Palpitations    Relevant Orders    Loop Recorder Implant - Treatment Room    Syncope    Relevant Orders    Loop Recorder Implant - Treatment Room       Respiratory    Shortness of breath - Primary    Relevant Orders    Loop Recorder Implant - Treatment Room       Nervous and Auditory    Chest pain    " Relevant Orders    Loop Recorder Implant - Treatment Room              Recommendation  1.  Patient has recurrent chest discomfort.  I would like to increase his Ranexa thousand milligrams twice a day to see if that will help.  He has 70% diagonal disease and this may need to be intervened at a later time to help improve his angina.  We will attempt medical therapy at this time.  2.  Patient has had recurrent syncopal episodes of palpitations and event monitor showing no significant arrhythmia.  I would like to schedule for loop implant because of patient's recurrent symptoms.  3.  We will see him back for follow-up on above testing.  Any chest pain not resolved by nitroglycerin, he will go to the ER.  Of note, we ordered a d-dimer last office visit because of patient's tachycardia and chest pain as well as history of syncope.  He did not have that done.  We would like for him to have that checked to rule out pulmonary and was.  He'll follow-up with primary as scheduled       I advised Arias of the risks of continuing to use tobacco, and I provided him with tobacco cessation educational materials in the After Visit Summary.     During this visit, I spent <3 minutes counseling the patient regarding tobacco cessation.     Patient's Body mass index is 19.76 kg/m². BMI is within normal parameters. No follow-up required.       Electronically signed by:

## 2018-10-08 ENCOUNTER — TELEPHONE (OUTPATIENT)
Dept: CARDIOLOGY | Facility: CLINIC | Age: 48
End: 2018-10-08

## 2018-10-08 NOTE — TELEPHONE ENCOUNTER
Patient aware of date and arrival time. Patient verbalized understanding with no questions. KARRIE Swenson    ----- Message from Nayely Pickett LPN sent at 10/1/2018  4:13 PM EDT -----  Contact: PATIENT      ----- Message -----  From: Esha Mccauley  Sent: 10/1/2018   1:25 PM  To: [facility]    Pt rescheduled for 10/22 @ 2pm    ----- Message -----  From: Rashida Ames LPN  Sent: 9/27/2018   6:12 PM  To: Esha Mccauley    Will you please reschedule patient and let Leela know the new date and time. Thank You!!  ----- Message -----  From: Aby Hunt  Sent: 9/26/2018   1:14 PM  To: Rashida Ames LPN, Leela Mckee MA, #    THE PATIENT CALLED AND STATES HE WAS SCHEDULED FOR A LOOP IMPLANT ON Monday AND HE CANCELLED THAT WITH THE HOSPITAL.   HE IS WANTING TO GET THAT APPOINTMENT RESCHEDULED.  HE CAN BE REACHED .[phone].  THANKS

## 2018-10-25 ENCOUNTER — TELEPHONE (OUTPATIENT)
Dept: CARDIOLOGY | Facility: CLINIC | Age: 48
End: 2018-10-25

## 2018-10-25 NOTE — TELEPHONE ENCOUNTER
----- Message from Yuly Esparza sent at 10/22/2018  2:57 PM EDT -----  PATIENT CALLED THE HOSPITAL PER PACER REPRESENTATIVE, JOSÉ, AND CANCELLED HIS APPOINTMENT FOR HIS LOOP IMPLANT DUE TO BEING SICK.

## 2018-10-25 NOTE — TELEPHONE ENCOUNTER
Due to this patient NO SHOWING for first procedure and cancelling second procedure he will need to be seen in office prior to being rescheduled. -NAVID;[id number]

## 2019-01-22 RX ORDER — RANOLAZINE 500 MG/1
TABLET, FILM COATED, EXTENDED RELEASE ORAL
Qty: 60 TABLET | Refills: 0 | Status: SHIPPED | OUTPATIENT
Start: 2019-01-22 | End: 2019-02-18 | Stop reason: SDUPTHER

## 2019-02-18 RX ORDER — RANOLAZINE 500 MG/1
500 TABLET, EXTENDED RELEASE ORAL 2 TIMES DAILY
Qty: 60 TABLET | Refills: 0 | Status: SHIPPED | OUTPATIENT
Start: 2019-02-18

## 2019-03-28 ENCOUNTER — OFFICE VISIT (OUTPATIENT)
Dept: CARDIOLOGY | Facility: CLINIC | Age: 49
End: 2019-03-28

## 2019-03-28 VITALS
BODY MASS INDEX: 22.07 KG/M2 | HEART RATE: 86 BPM | SYSTOLIC BLOOD PRESSURE: 127 MMHG | DIASTOLIC BLOOD PRESSURE: 73 MMHG | HEIGHT: 69 IN | OXYGEN SATURATION: 97 % | WEIGHT: 149 LBS

## 2019-03-28 DIAGNOSIS — Z53.21 PATIENT LEFT WITHOUT BEING SEEN: Primary | ICD-10-CM

## 2019-04-12 ENCOUNTER — TELEPHONE (OUTPATIENT)
Dept: CARDIOLOGY | Facility: CLINIC | Age: 49
End: 2019-04-12